# Patient Record
Sex: FEMALE | Race: ASIAN | NOT HISPANIC OR LATINO
[De-identification: names, ages, dates, MRNs, and addresses within clinical notes are randomized per-mention and may not be internally consistent; named-entity substitution may affect disease eponyms.]

---

## 2021-02-04 ENCOUNTER — ASOB RESULT (OUTPATIENT)
Age: 26
End: 2021-02-04

## 2021-02-04 ENCOUNTER — APPOINTMENT (OUTPATIENT)
Dept: ANTEPARTUM | Facility: CLINIC | Age: 26
End: 2021-02-04
Payer: MEDICAID

## 2021-02-04 DIAGNOSIS — Q99.9 CHROMOSOMAL ABNORMALITY, UNSPECIFIED: ICD-10-CM

## 2021-02-04 PROBLEM — Z00.00 ENCOUNTER FOR PREVENTIVE HEALTH EXAMINATION: Status: ACTIVE | Noted: 2021-02-04

## 2021-02-04 PROCEDURE — 99072 ADDL SUPL MATRL&STAF TM PHE: CPT

## 2021-02-04 PROCEDURE — 76813 OB US NUCHAL MEAS 1 GEST: CPT

## 2021-02-09 LAB
1ST TRIMESTER DATA: NORMAL
ADDENDUM DOC: NORMAL
AFP PNL SERPL: NORMAL
AFP SERPL-ACNC: NORMAL
CLINICAL BIOCHEMIST REVIEW: NORMAL
FREE BETA HCG 1ST TRIMESTER: NORMAL
Lab: NORMAL
NOTES NTD: NORMAL
NT: NORMAL
PAPP-A SERPL-ACNC: NORMAL
TRISOMY 18/3: NORMAL

## 2021-03-25 ENCOUNTER — APPOINTMENT (OUTPATIENT)
Dept: ANTEPARTUM | Facility: CLINIC | Age: 26
End: 2021-03-25
Payer: MEDICAID

## 2021-03-25 ENCOUNTER — ASOB RESULT (OUTPATIENT)
Age: 26
End: 2021-03-25

## 2021-03-25 DIAGNOSIS — O35.1XX0 MATERNAL CARE FOR (SUSPECTED) CHROMOSOMAL ABNORMALITY IN FETUS, NOT APPLICABLE OR UNSPECIFIED: ICD-10-CM

## 2021-03-25 PROCEDURE — 99072 ADDL SUPL MATRL&STAF TM PHE: CPT

## 2021-03-25 PROCEDURE — 76811 OB US DETAILED SNGL FETUS: CPT | Mod: 59

## 2021-03-29 LAB
1ST TRIMESTER DATA: NORMAL
2ND TRIMESTER DATA: NORMAL
ADDENDUM DOC: NORMAL
AFP PNL SERPL: NORMAL
AFP SERPL-ACNC: NORMAL
AFP SERPL-ACNC: NORMAL
B-HCG FREE SERPL-MCNC: NORMAL
CLINICAL BIOCHEMIST REVIEW: NORMAL
FREE BETA HCG 1ST TRIMESTER: NORMAL
INHIBIN A SERPL-MCNC: NORMAL
NOTES NTD: NORMAL
NT: NORMAL
PAPP-A SERPL-ACNC: NORMAL
U ESTRIOL SERPL-SCNC: NORMAL

## 2021-04-22 ENCOUNTER — APPOINTMENT (OUTPATIENT)
Dept: ANTEPARTUM | Facility: CLINIC | Age: 26
End: 2021-04-22
Payer: MEDICAID

## 2021-04-22 ENCOUNTER — ASOB RESULT (OUTPATIENT)
Age: 26
End: 2021-04-22

## 2021-04-22 PROCEDURE — 76817 TRANSVAGINAL US OBSTETRIC: CPT

## 2021-04-22 PROCEDURE — 99072 ADDL SUPL MATRL&STAF TM PHE: CPT

## 2021-04-22 PROCEDURE — 76816 OB US FOLLOW-UP PER FETUS: CPT

## 2021-05-04 ENCOUNTER — ASOB RESULT (OUTPATIENT)
Age: 26
End: 2021-05-04

## 2021-05-04 ENCOUNTER — APPOINTMENT (OUTPATIENT)
Dept: ANTEPARTUM | Facility: CLINIC | Age: 26
End: 2021-05-04
Payer: MEDICAID

## 2021-05-04 ENCOUNTER — NON-APPOINTMENT (OUTPATIENT)
Age: 26
End: 2021-05-04

## 2021-05-04 PROCEDURE — 76817 TRANSVAGINAL US OBSTETRIC: CPT

## 2021-05-04 PROCEDURE — 99072 ADDL SUPL MATRL&STAF TM PHE: CPT

## 2021-05-06 ENCOUNTER — EMERGENCY (EMERGENCY)
Facility: HOSPITAL | Age: 26
LOS: 1 days | Discharge: STILL ADMITTED OR ADMINISTRATIVE | End: 2021-05-06
Admitting: EMERGENCY MEDICINE
Payer: SELF-PAY

## 2021-05-06 ENCOUNTER — APPOINTMENT (OUTPATIENT)
Dept: ANTEPARTUM | Facility: HOSPITAL | Age: 26
End: 2021-05-06

## 2021-05-06 ENCOUNTER — ASOB RESULT (OUTPATIENT)
Age: 26
End: 2021-05-06

## 2021-05-06 ENCOUNTER — INPATIENT (INPATIENT)
Facility: HOSPITAL | Age: 26
LOS: 0 days | Discharge: HOME | End: 2021-05-07
Attending: OBSTETRICS & GYNECOLOGY | Admitting: OBSTETRICS & GYNECOLOGY

## 2021-05-06 VITALS
OXYGEN SATURATION: 100 % | HEART RATE: 93 BPM | RESPIRATION RATE: 15 BRPM | SYSTOLIC BLOOD PRESSURE: 119 MMHG | TEMPERATURE: 98 F | DIASTOLIC BLOOD PRESSURE: 75 MMHG

## 2021-05-06 VITALS — TEMPERATURE: 98 F

## 2021-05-06 DIAGNOSIS — Z3A.00 WEEKS OF GESTATION OF PREGNANCY NOT SPECIFIED: ICD-10-CM

## 2021-05-06 DIAGNOSIS — O26.899 OTHER SPECIFIED PREGNANCY RELATED CONDITIONS, UNSPECIFIED TRIMESTER: ICD-10-CM

## 2021-05-06 DIAGNOSIS — Z98.891 HISTORY OF UTERINE SCAR FROM PREVIOUS SURGERY: Chronic | ICD-10-CM

## 2021-05-06 LAB
APPEARANCE UR: CLEAR — SIGNIFICANT CHANGE UP
BASOPHILS # BLD AUTO: 0.05 K/UL — SIGNIFICANT CHANGE UP (ref 0–0.2)
BASOPHILS NFR BLD AUTO: 0.4 % — SIGNIFICANT CHANGE UP (ref 0–2)
BILIRUB UR-MCNC: NEGATIVE — SIGNIFICANT CHANGE UP
COLOR SPEC: COLORLESS — SIGNIFICANT CHANGE UP
COVID-19 SPIKE DOMAIN AB INTERP: POSITIVE
COVID-19 SPIKE DOMAIN ANTIBODY RESULT: 16.5 U/ML — HIGH
DIFF PNL FLD: NEGATIVE — SIGNIFICANT CHANGE UP
EOSINOPHIL # BLD AUTO: 0.13 K/UL — SIGNIFICANT CHANGE UP (ref 0–0.5)
EOSINOPHIL NFR BLD AUTO: 1 % — SIGNIFICANT CHANGE UP (ref 0–6)
GLUCOSE UR QL: NEGATIVE — SIGNIFICANT CHANGE UP
HCT VFR BLD CALC: 35.2 % — SIGNIFICANT CHANGE UP (ref 34.5–45)
HGB BLD-MCNC: 11.3 G/DL — LOW (ref 11.5–15.5)
IANC: 9.06 K/UL — HIGH (ref 1.5–8.5)
IMM GRANULOCYTES NFR BLD AUTO: 1.1 % — SIGNIFICANT CHANGE UP (ref 0–1.5)
KETONES UR-MCNC: NEGATIVE — SIGNIFICANT CHANGE UP
LEUKOCYTE ESTERASE UR-ACNC: NEGATIVE — SIGNIFICANT CHANGE UP
LYMPHOCYTES # BLD AUTO: 24.7 % — SIGNIFICANT CHANGE UP (ref 13–44)
LYMPHOCYTES # BLD AUTO: 3.32 K/UL — HIGH (ref 1–3.3)
MCHC RBC-ENTMCNC: 26.6 PG — LOW (ref 27–34)
MCHC RBC-ENTMCNC: 32.1 GM/DL — SIGNIFICANT CHANGE UP (ref 32–36)
MCV RBC AUTO: 82.8 FL — SIGNIFICANT CHANGE UP (ref 80–100)
MONOCYTES # BLD AUTO: 0.71 K/UL — SIGNIFICANT CHANGE UP (ref 0–0.9)
MONOCYTES NFR BLD AUTO: 5.3 % — SIGNIFICANT CHANGE UP (ref 2–14)
NEUTROPHILS # BLD AUTO: 9.06 K/UL — HIGH (ref 1.8–7.4)
NEUTROPHILS NFR BLD AUTO: 67.5 % — SIGNIFICANT CHANGE UP (ref 43–77)
NITRITE UR-MCNC: NEGATIVE — SIGNIFICANT CHANGE UP
NRBC # BLD: 0 /100 WBCS — SIGNIFICANT CHANGE UP
NRBC # FLD: 0 K/UL — SIGNIFICANT CHANGE UP
PH UR: 7.5 — SIGNIFICANT CHANGE UP (ref 5–8)
PLATELET # BLD AUTO: 174 K/UL — SIGNIFICANT CHANGE UP (ref 150–400)
PROT UR-MCNC: NEGATIVE — SIGNIFICANT CHANGE UP
RBC # BLD: 4.25 M/UL — SIGNIFICANT CHANGE UP (ref 3.8–5.2)
RBC # FLD: 13.6 % — SIGNIFICANT CHANGE UP (ref 10.3–14.5)
RH IG SCN BLD-IMP: POSITIVE — SIGNIFICANT CHANGE UP
SARS-COV-2 IGG+IGM SERPL QL IA: 16.5 U/ML — HIGH
SARS-COV-2 IGG+IGM SERPL QL IA: POSITIVE
SARS-COV-2 RNA SPEC QL NAA+PROBE: SIGNIFICANT CHANGE UP
SP GR SPEC: 1.01 — SIGNIFICANT CHANGE UP (ref 1.01–1.02)
T PALLIDUM AB TITR SER: NEGATIVE — SIGNIFICANT CHANGE UP
UROBILINOGEN FLD QL: SIGNIFICANT CHANGE UP
WBC # BLD: 13.42 K/UL — HIGH (ref 3.8–10.5)
WBC # FLD AUTO: 13.42 K/UL — HIGH (ref 3.8–10.5)

## 2021-05-06 PROCEDURE — L9993: CPT

## 2021-05-06 RX ORDER — MAGNESIUM SULFATE 500 MG/ML
4 VIAL (ML) INJECTION ONCE
Refills: 0 | Status: COMPLETED | OUTPATIENT
Start: 2021-05-06 | End: 2021-05-06

## 2021-05-06 RX ORDER — PROGESTERONE 200 MG/1
200 CAPSULE, LIQUID FILLED ORAL AT BEDTIME
Refills: 0 | Status: DISCONTINUED | OUTPATIENT
Start: 2021-05-06 | End: 2021-05-07

## 2021-05-06 RX ORDER — AMPICILLIN TRIHYDRATE 250 MG
1 CAPSULE ORAL EVERY 4 HOURS
Refills: 0 | Status: DISCONTINUED | OUTPATIENT
Start: 2021-05-06 | End: 2021-05-06

## 2021-05-06 RX ORDER — SODIUM CHLORIDE 9 MG/ML
1000 INJECTION, SOLUTION INTRAVENOUS ONCE
Refills: 0 | Status: DISCONTINUED | OUTPATIENT
Start: 2021-05-06 | End: 2021-05-07

## 2021-05-06 RX ORDER — HEPARIN SODIUM 5000 [USP'U]/ML
5000 INJECTION INTRAVENOUS; SUBCUTANEOUS EVERY 12 HOURS
Refills: 0 | Status: DISCONTINUED | OUTPATIENT
Start: 2021-05-06 | End: 2021-05-07

## 2021-05-06 RX ORDER — MAGNESIUM SULFATE 500 MG/ML
2 VIAL (ML) INJECTION
Qty: 40 | Refills: 0 | Status: DISCONTINUED | OUTPATIENT
Start: 2021-05-06 | End: 2021-05-06

## 2021-05-06 RX ORDER — ASPIRIN/CALCIUM CARB/MAGNESIUM 324 MG
81 TABLET ORAL DAILY
Refills: 0 | Status: DISCONTINUED | OUTPATIENT
Start: 2021-05-06 | End: 2021-05-07

## 2021-05-06 RX ORDER — AMPICILLIN TRIHYDRATE 250 MG
2 CAPSULE ORAL ONCE
Refills: 0 | Status: COMPLETED | OUTPATIENT
Start: 2021-05-06 | End: 2021-05-06

## 2021-05-06 RX ORDER — SODIUM CHLORIDE 9 MG/ML
1000 INJECTION, SOLUTION INTRAVENOUS
Refills: 0 | Status: DISCONTINUED | OUTPATIENT
Start: 2021-05-06 | End: 2021-05-06

## 2021-05-06 RX ADMIN — SODIUM CHLORIDE 75 MILLILITER(S): 9 INJECTION, SOLUTION INTRAVENOUS at 10:46

## 2021-05-06 RX ADMIN — HEPARIN SODIUM 5000 UNIT(S): 5000 INJECTION INTRAVENOUS; SUBCUTANEOUS at 22:35

## 2021-05-06 RX ADMIN — SODIUM CHLORIDE 50 MILLILITER(S): 9 INJECTION, SOLUTION INTRAVENOUS at 10:46

## 2021-05-06 RX ADMIN — Medication 200 GRAM(S): at 10:52

## 2021-05-06 RX ADMIN — Medication 216 GRAM(S): at 10:49

## 2021-05-06 RX ADMIN — Medication 12 MILLIGRAM(S): at 11:00

## 2021-05-06 RX ADMIN — Medication 50 GM/HR: at 11:14

## 2021-05-06 NOTE — OB PROVIDER TRIAGE NOTE - NSOBPROVIDERNOTE_OBGYN_ALL_OB_FT
IVF 1 liter bolus for uterine contractions, known short cx  ua  d/w Dr Hanley- Dr Hanley would like MFM consult to determine if betamethasone appropriate, and to have resident repeat CL to compare.  d/w Dr Ricketts and made aware Dr Hanley would like MFM and for a resident to repeat TVS for CL. Dr Ricketts express concern of contractions on toco and for PTL, wants more monitoring, finish IV fluid bolus and ensure not making cervical change, although pt not feeling pain, cramping or discomfort. Team to discuss patient with MFM and come up with plan and reevaluate patient. IVF 1 liter bolus for uterine contractions, known short cx  ua  d/w Dr Hanley- Dr Hanley would like MFM consult to determine if betamethasone appropriate, and to have resident repeat CL to compare.  d/w Dr Ricketts and made aware Dr Hanley would like MFM and for a resident to repeat TVS for CL. Dr Ricketts express concern of contractions on toco and for PTL, wants more monitoring, finish IV fluid bolus and ensure not making cervical change, although pt not feeling pain, cramping or discomfort. Team to discuss patient with MFM and come up with plan and reevaluate patient.    0735 By the bedside to assess patient. Patient lying comfortably in bed. Patient does not appear to be in distress. Patient reports of fetal movement. Denies cramping, contractions, loss of fluid and/or vaginal bleeding. Awaiting antepartum resident and MFM consult. IVF 1 liter bolus for uterine contractions, known short cx  ua  d/w Dr Hanley- Dr Hnaley would like MFM consult to determine if betamethasone appropriate, and to have resident repeat CL to compare.  d/w Dr Ricketts and made aware Dr Hanley would like MFM and for a resident to repeat TVS for CL. Dr Ricketts express concern of contractions on toco and for PTL, wants more monitoring, finish IV fluid bolus and ensure not making cervical change, although pt not feeling pain, cramping or discomfort. Team to discuss patient with MFM and come up with plan and reevaluate patient.    0735 By the bedside to assess patient. Patient lying comfortably in bed. Patient does not appear to be in distress. Patient reports of fetal movement. Denies cramping, contractions, loss of fluid and/or vaginal bleeding. Awaiting antepartum resident and MFM consult.    4840   Evidence of cervical shortening and pre term contractions  - admit to labor and delivery, discussed with   - for Betamethasone, Ampicillin and Magnesium Sulfate  - admission consents obtained  - COVID testing for patient obtained, no support person by the bedside.

## 2021-05-06 NOTE — ED ADULT TRIAGE NOTE - CHIEF COMPLAINT QUOTE
Pt c/o approx 27 weeks pregnant and "I think my water broke."  Denies any contractions.  Endorses an urge to urinate.  AZAR 8/9/21.

## 2021-05-06 NOTE — OB PROVIDER TRIAGE NOTE - HISTORY OF PRESENT ILLNESS
26 yo  @ 26.3 wks comes in reporting dec fetal movement at 2am, and feeling fluid- stick white discharge at 0200. denies vb, reports +GFM during D&T.. AP course complicated by low pappA on ASA, known short cervix -1cm with funneling,  0.8 cm with funneling on vaginal progesterone. pt reports having intercourse 3 days ago. denies fever chills dysuria ha n/v new swelling vision changes cp sob or cough. last saw OB 5/3 and repeat sono .    meds: PNV ASA progesterone vaginally  all: denies  PMH: denies  PSH: c/s x 1  gyn hx: denies  ob hx: primary c/s @ 36 wks for failure to dilate in [facility] denies complciations

## 2021-05-06 NOTE — CONSULT NOTE ADULT - SUBJECTIVE AND OBJECTIVE BOX
R3 MFM Consult Note    HPI:  24 yo  at 26w3d(AZAR 21 by early sono) presenting with decreased fetal movements since 2am. Upon arrival patient stated that she had fetal movement. She also complains of increased sticky white discharge at 0200. Denies VB. She also reported contractions and cramping when she first arrived which has now resoled.     AP course complicated by low pappA now taking ASA. Known incidentally found short cervix at anatomy. On -1cm with funneling and was started on vaginal progesterone., 5/ 0.8 cm with funneling. Pt reports having intercourse 3 days ago. Denies fever chills dysuria ha n/v new swelling vision changes cp sob or cough.    Meds: PNV ASA progesterone vaginally  All: denies  PMH: denies  PSH: c/s x 1  gyn hx: denies  ob hx: primary c/s @ 36 wks for failure to dilate (states that she came in labor and also had bleeding) in [facility]     Name of GYN Physician:     POB:      Pgyn: Denies fibroids, cysts, endometriosis, STI's, Abnormal pap smears     Home meds:     Hospital Meds:   MEDICATIONS  (STANDING):  ampicillin  IVPB 2 Gram(s) IV Intermittent once  ampicillin  IVPB 1 Gram(s) IV Intermittent every 4 hours  betamethasone Injectable 12 milliGRAM(s) IntraMuscular every 24 hours  lactated ringers Bolus 1000 milliLiter(s) IV Bolus once  lactated ringers. 1000 milliLiter(s) (50 mL/Hr) IV Continuous <Continuous>  magnesium sulfate  IVPB 4 Gram(s) IV Intermittent once  magnesium sulfate Infusion 2 Gm/Hr (50 mL/Hr) IV Continuous <Continuous>    MEDICATIONS  (PRN):    Allergies    No Known Allergies    Intolerances    PAST MEDICAL & SURGICAL HISTORY:  No pertinent past medical history  H/O:  section  2017    FAMILY HISTORY: None pertinent    Social History:  Denies smoking use, drug use, alcohol use.   +occasional social alcohol use    Vital Signs Last 24 Hrs  T(C): 36.9 (06 May 2021 04:44), Max: 36.9 (06 May 2021 04:01)  T(F): 98.4 (06 May 2021 04:44), Max: 98.42 (06 May 2021 04:37)  HR: 90 (06 May 2021 09:04) (70 - 93)  BP: 118/81 (06 May 2021 09:04) (92/55 - 119/75)  BP(mean): --  RR: 16 (06 May 2021 04:44) (15 - 16)  SpO2: 98% (06 May 2021 08:38) (98% - 100%)    Physical Exam:   General: sitting comftorably in bed, NAD   HEENT: neck supple, full ROM  CV: RR S1S2 no m/r/g  Lungs: CTA b/l, good air flow b/l   Back: No CVA tenderness  Abd: Soft, non-tender, non-distended.  Bowel sounds present.    :  No bleeding on pad.    External labia wnl.  Bimanual exam with no cervical motion tenderness, uterus wnl, adnexa non palpable b/l.  Cervix closed vs. Cervix dilated    cm   Speculum Exam: No active bleeding from os.  Physiologic discharge.    Ext: non-tender b/l, no edema     LABS:                I&O's Detail      Urinalysis Basic - ( 06 May 2021 06:13 )    Color: Colorless / Appearance: Clear / S.010 / pH: x  Gluc: x / Ketone: Negative  / Bili: Negative / Urobili: <2 mg/dL   Blood: x / Protein: Negative / Nitrite: Negative   Leuk Esterase: Negative / RBC: x / WBC x   Sq Epi: x / Non Sq Epi: x / Bacteria: x        RADIOLOGY & ADDITIONAL STUDIES: R3 MFM Consult Note    HPI:  24 yo  at 26w3d (AZAR 21 by early sono) presenting with decreased fetal movements since 2am. Upon arrival patient stated that she had fetal movement. She also complains of increased sticky white discharge at 0200. Denies VB. She also reported contractions and cramping when she first arrived which has now resoled.     AP course complicated by low pappA now taking ASA. Known incidentally found short cervix at anatomy. On -1cm with funneling and was started on vaginal progesterone. / 0.8 cm with funneling. Pt reports having intercourse 3 days ago. Denies fever chills dysuria ha n/v new swelling vision changes CP, SOB or cough.    Meds: PNV ASA progesterone vaginally  All: denies  PMH: denies  PSH: c/s x 1  gyn hx: denies  ob hx: primary c/s @ 36 wks for failure to dilate (states that she came in labor and also had bleeding) in [facility]     Name of GYN Physician: Dr. Jade    Vital Signs Last 24 Hrs  T(C): 36.9 (06 May 2021 04:44), Max: 36.9 (06 May 2021 04:01)  T(F): 98.4 (06 May 2021 04:44), Max: 98.42 (06 May 2021 04:37)  HR: 90 (06 May 2021 09:04) (70 - 93)  BP: 118/81 (06 May 2021 09:04) (92/55 - 119/75)  BP(mean): --  RR: 16 (06 May 2021 04:44) (15 - 16)  SpO2: 98% (06 May 2021 08:38) (98% - 100%)    Physical Exam:   General: sitting comftorably in bed, NAD   HEENT: neck supple, full ROM  CV: RR S1S2 no m/r/g  Lungs: CTA b/l, good air flow b/l   Back: No CVA tenderness  Abd: Soft, non-tender, non-distended.  Bowel sounds present.    :  No bleeding on pad.    External labia wnl.  Bimanual exam with no cervical motion tenderness, uterus wnl, adnexa non palpable b/l.  Cervix closed vs. Cervix dilated    cm   Speculum Exam: No active bleeding from os.  Physiologic discharge.    Ext: non-tender b/l, no edema     LABS:                I&O's Detail      Urinalysis Basic - ( 06 May 2021 06:13 )    Color: Colorless / Appearance: Clear / S.010 / pH: x  Gluc: x / Ketone: Negative  / Bili: Negative / Urobili: <2 mg/dL   Blood: x / Protein: Negative / Nitrite: Negative   Leuk Esterase: Negative / RBC: x / WBC x   Sq Epi: x / Non Sq Epi: x / Bacteria: x        RADIOLOGY & ADDITIONAL STUDIES: R3 MFM Consult Note    HPI:  26 yo  at 26w3d (AZAR 21 by early sono) presenting with decreased fetal movements since 2am. Upon arrival patient stated that she had fetal movement. She also complains of increased sticky white discharge at 0200. Denies VB. She also reported contractions and cramping when she first arrived which has now resoled.     AP course complicated by low pappA now taking ASA. Known incidentally found short cervix at anatomy. On -1cm with funneling and was started on vaginal progesterone. 5/ 0.8 cm with funneling. Pt reports having intercourse 3 days ago. Denies fever chills dysuria ha n/v new swelling vision changes CP, SOB or cough.    Meds: PNV ASA progesterone vaginally  All: denies  PMH: denies  PSH: c/s x 1  gyn hx: denies  ob hx: primary c/s @ 36 wks for failure to dilate (states that she came in labor and also had bleeding) in [facility]     Name of GYN Physician: Dr. Jade    Vital Signs Last 24 Hrs  T(C): 36.9 (06 May 2021 04:44), Max: 36.9 (06 May 2021 04:01)  T(F): 98.4 (06 May 2021 04:44), Max: 98.42 (06 May 2021 04:37)  HR: 90 (06 May 2021 09:04) (70 - 93)  BP: 118/81 (06 May 2021 09:04) (92/55 - 119/75)  BP(mean): --  RR: 16 (06 May 2021 04:44) (15 - 16)  SpO2: 98% (06 May 2021 08:38) (98% - 100%)    Physical Exam:   General: sitting comfortably in bed, NAD   CV: RR S1S2 no m/r/g  Lungs: CTA b/l, good air flow b/l   Back: No CVA tenderness  Abd: Soft, non-tender, non-distended.  Bowel sounds present.    :  No bleeding on pad. External labia wnl.  Bimanual exam with no cervical motion tenderness, uterus wnl, adnexa non palpable b/l. Cervix dilated 1cm   Speculum Exam: No active bleeding from os. cervix appears slightly open. Remnants of vaginal progesterone noted. ferning, nitrazine, and pooling neg.  Physiologic discharge.    Ext: non-tender b/l, no edema     TVS: CL 0.9-1.4 with funneling- pt with known short cx  TAS:  posterior placenta  vertex  BERENICE: 11.9  EFW:801g/1#12    LABS:  pending    Urinalysis Basic - ( 06 May 2021 06:13 )    Color: Colorless / Appearance: Clear / S.010 / pH: x  Gluc: x / Ketone: Negative  / Bili: Negative / Urobili: <2 mg/dL   Blood: x / Protein: Negative / Nitrite: Negative   Leuk Esterase: Negative / RBC: x / WBC x   Sq Epi: x / Non Sq Epi: x / Bacteria: x

## 2021-05-06 NOTE — CONSULT NOTE ADULT - ASSESSMENT
26yo  at 26w3d here with hx of short cervix in the pregnancy on vaginal progesterone a/w  labor. Patient's contractions have now stopped after IV fluids started however she has had cervical change during her time in triage. Counseled patient there is still a 50% chance she may go to term however as there is a significant risk of continue  labor and  delivery, would recommend interventions and hospitalization at this time. Explained the utility of  steroids, Magnesium for neuroprotection, and ampicillin for unknown GBS status. Spoke with the patient using  as . Patient expressed understanding.    In regards to method of delivery, explained that c/s may be needed for fetal indication for urgent delivery.  Patient expressed desire to TOLAC and would like to discuss with her private doctor.   Fetus is vertex, TOLAC can be considered after discussions of risks of uterine rupture after 1 prior section.    -c/w BMZ, amp, Mg  -cont toco/efm  -re-evaluate for cervical change  -NICU consult    LINNEA Perez PGY-3  D/w Dr. Ruiz and Dr. Lopez

## 2021-05-06 NOTE — CHART NOTE - NSCHARTNOTEFT_GEN_A_CORE
Ms Meza is a 26 yo  @ 26.3 wks who is admitted with concerns of PTL. Started feeling decreased fetal movement and vaginal discharge at 2am. Noted to have shortened cervix, dilation to 1 cm and  contractions in triage. Admitted to the antepartum service. Receiving betamethasone, ampicillin and MgSO4. EFW is 801 g on admission. Baby is male.     NICU team met with Ms. Meza who had her  on the phone. The following was discussed with the couple:    1. The NICU team will be present at the time of delivery, and the infant will be placed under the warmer and immediately evaluated.    2. Due to extreme prematurity the infant will require respiratory support, either in the form of CPAP or intubation with mechanical ventilation. If the infant requires intubation, surfactant will be administered immediately at delivery or soon after. Due to prematurity, the infant may develop lung disease during the course of the NICU admission, referred to as bronchopulmonary dysplasia.    3. Full feedings will not be started right away. The infant would require placement of an orogastric or nasogastric tube to provide enteral feedings, and feeding volume would be advanced slowly as tolerated. IV nutrition in the form of TPN would be provided via umbilical line or PICC until the infant is able to tolerate full enteral feedings. Discussed the benefits of human milk for  infants and encouraged mother to pump following delivery. Mom's feeding plan is breastmilk only.     4. Due to the extreme prematurity, the infant would be at increased risk for infection and would likely be started on antibiotics after birth. The infant will be screened for infections following delivery and may require other courses of antibiotics during their hospital course if an infection were suspected. If the infant shows signs and symptoms of feeding intolerance, feedings may be held, and the infant may require evaluation for intestinal infection (necrotizing enterocolitis).    5. The infant will be monitored for hypotension and may require vasopressor medication. The infant may also require screening for a patent ductus arteriosus, and if clinically significant, may require medical or surgical treatment.    6. The infant will develop anemia of prematurity and may require blood transfusions.    1. The infant is at increased risk for intraventricular hemorrhage (IVH) because the blood vessels in the developing brain are very fragile. This may result in significant developmental delays. The baby will receive serial HUS to monitor for IVH. Even in the absence of IVH, the infant is at risk for developmental delays as a consequence of prematurity. The infant will be evaluated by a developmental pediatrician to monitor for neurodevelopmental delays.    7. The infant is at risk for retinopathy of prematurity (ROP). Severe ROP can lead to diminished visual acuity or blindness. The infant will be examined regularly by a pediatric ophthalmologist, and if ROP is severe may require medication or eye surgery.    8. The infant is at risk for jaundice and may require phototherapy.    9. Thermoregulation issues and need to be in an isolette with slow weaning to a crib discussed.    10. Length of stay is highly variable, but given the infant’s gestational age, average stay is approximately 2.5-3 months. Discussed discharged criteria.    Ms. Meza and her partner had the chance to ask any questions they may have had, and was encouraged to contact the NICU at that time if additional questions arise.    Thank you for the opportunity to participate in the care of this patient and please inform us of any changes in her status.

## 2021-05-06 NOTE — OB RN TRIAGE NOTE - CHIEF COMPLAINT QUOTE
Fluid leakage since 0300 , and decreased fetal movement since 0200 . Pt. is a previous C/Sx1 in [facility].

## 2021-05-06 NOTE — OB PROVIDER TRIAGE NOTE - NSHPPHYSICALEXAM_GEN_ALL_CORE
LS clear bilaterally  abd soft gravid NT  CV RRR  SSE: cx appears closed- cx located maternal right anterior vaginal wall, no pooling or bleeding nitrazine negative fern negative  SVE: external os fingertip soft  TVS: CL 0.9-1.4 with funneling- pt with known short cx  TAS:  posterior placenta  vertex  BERENICE: 11.9  EFW:801g/1#12  FHT: moderate varaibility, + accels, + variables  toco: + contractions q 3-4 minutes - pt not feeling cramping or lower back pain 0/10 on pain scale  Vital Signs Last 24 Hrs  T(C): 36.9 (06 May 2021 04:44), Max: 36.9 (06 May 2021 04:01)  T(F): 98.4 (06 May 2021 04:44), Max: 98.42 (06 May 2021 04:37)  HR: 86 (06 May 2021 04:44) (86 - 93)  BP: 113/77 (06 May 2021 04:44) (113/77 - 119/75)  BP(mean): --  RR: 16 (06 May 2021 04:44) (15 - 16)  SpO2: 100% (06 May 2021 04:01) (100% - 100%) LS clear bilaterally  abd soft gravid NT  CV RRR  SSE: cx appears closed- cx located maternal right anterior vaginal wall, no pooling or bleeding nitrazine negative fern negative  SVE: external os fingertip soft  TVS: CL 0.9-1.4 with funneling- pt with known short cx  TAS:  posterior placenta  vertex  BERENICE: 11.9  EFW:801g/1#12  FHT: moderate varaibility, + accels, + variables  toco: + contractions q 3-4 minutes - pt not feeling cramping or lower back pain 0/10 on pain scale  Vital Signs Last 24 Hrs  T(C): 36.9 (06 May 2021 04:44), Max: 36.9 (06 May 2021 04:01)  T(F): 98.4 (06 May 2021 04:44), Max: 98.42 (06 May 2021 04:37)  HR: 86 (06 May 2021 04:44) (86 - 93)  BP: 113/77 (06 May 2021 04:44) (113/77 - 119/75)  BP(mean): --  RR: 16 (06 May 2021 04:44) (15 - 16)  SpO2: 100% (06 May 2021 04:01) (100% - 100%)    Repeat exam at 909am by  1/50/-3

## 2021-05-06 NOTE — OB PROVIDER H&P - PROBLEM SELECTOR PLAN 1
Evidence of cervical shortening and pre term contractions  - admit to labor and delivery, discussed with   - for Betamethasone, Ampicillin and Magnesium Sulfate  - admission consents obtained  - COVID testing for patient obtained, no support person by the bedside.   - urine culture ordered, obtained, result pending   - GBS specimen obtained, ordered, result pending

## 2021-05-06 NOTE — OB PROVIDER H&P - HISTORY OF PRESENT ILLNESS
26 yo  @ 26.3 wks comes in reporting dec fetal movement at 2am, and feeling fluid- stick white discharge at 0200. denies vb, reports +GFM during D&T.. AP course complicated by low pappA on ASA, known short cervix -1cm with funneling,  0.8 cm with funneling on vaginal progesterone. pt reports having intercourse 3 days ago. denies fever chills dysuria ha n/v new swelling vision changes cp sob or cough. last saw OB 5/3 and repeat sono .    meds: PNV ASA progesterone vaginally  all: denies  PMH: denies  PSH: c/s x 1  gyn hx: denies  ob hx: primary c/s @ 36 wks for failure to dilate in [facility] denies complciations

## 2021-05-06 NOTE — OB PROVIDER H&P - NSHPPHYSICALEXAM_GEN_ALL_CORE
LS clear bilaterally  abd soft gravid NT  CV RRR  SSE: cx appears closed- cx located maternal right anterior vaginal wall, no pooling or bleeding nitrazine negative fern negative  SVE: external os fingertip soft  TVS: CL 0.9-1.4 with funneling- pt with known short cx  TAS:  posterior placenta  vertex  BERENICE: 11.9  EFW:801g/1#12  FHT: moderate varaibility, + accels, + variables  toco: + contractions q 3-4 minutes - pt not feeling cramping or lower back pain 0/10 on pain scale  Vital Signs Last 24 Hrs  T(C): 36.9 (06 May 2021 04:44), Max: 36.9 (06 May 2021 04:01)  T(F): 98.4 (06 May 2021 04:44), Max: 98.42 (06 May 2021 04:37)  HR: 86 (06 May 2021 04:44) (86 - 93)  BP: 113/77 (06 May 2021 04:44) (113/77 - 119/75)  BP(mean): --  RR: 16 (06 May 2021 04:44) (15 - 16)  SpO2: 100% (06 May 2021 04:01) (100% - 100%)    Repeat exam at 909am by  1/50/-3

## 2021-05-07 ENCOUNTER — TRANSCRIPTION ENCOUNTER (OUTPATIENT)
Age: 26
End: 2021-05-07

## 2021-05-07 VITALS
TEMPERATURE: 98 F | SYSTOLIC BLOOD PRESSURE: 98 MMHG | RESPIRATION RATE: 18 BRPM | DIASTOLIC BLOOD PRESSURE: 51 MMHG | OXYGEN SATURATION: 98 % | HEART RATE: 81 BPM

## 2021-05-07 RX ORDER — PROGESTERONE 200 MG/1
1 CAPSULE, LIQUID FILLED ORAL
Qty: 0 | Refills: 0 | DISCHARGE

## 2021-05-07 RX ORDER — PROGESTERONE 200 MG/1
200 CAPSULE, LIQUID FILLED ORAL
Qty: 0 | Refills: 0 | DISCHARGE
Start: 2021-05-07

## 2021-05-07 RX ADMIN — HEPARIN SODIUM 5000 UNIT(S): 5000 INJECTION INTRAVENOUS; SUBCUTANEOUS at 11:24

## 2021-05-07 RX ADMIN — Medication 81 MILLIGRAM(S): at 11:24

## 2021-05-07 RX ADMIN — PROGESTERONE 200 MILLIGRAM(S): 200 CAPSULE, LIQUID FILLED ORAL at 00:33

## 2021-05-07 RX ADMIN — Medication 12 MILLIGRAM(S): at 11:24

## 2021-05-07 RX ADMIN — Medication 1 TABLET(S): at 11:25

## 2021-05-07 NOTE — PROGRESS NOTE ADULT - ASSESSMENT
26yo  at 26w4d with known short cervix on vaginal progesterone a/w  labor. CTX stopped with IV hydration however patient made cervical change from closed to /-3. VE stable on repeat exam. Asymptomatic currently. Maternal and fetal status reassuring overnight.    # labor  - WBC 13, UA clean  - c/w BMZ (-)  - s/p Amp ()  - s/p Mag ()  - Monitor for worsening contractions or pressure    #FWB  - BMZ as above  - NST BID  - s/p NICU consult  - PNVs    #MWB  - Reg diet  - SLIV  - HSQ for DVT ppx  - Desires TOLAC, vtx on most recent nico Doss PGY3

## 2021-05-07 NOTE — DISCHARGE NOTE ANTEPARTUM - CARE PROVIDER_API CALL
Alex Jade)  Obstetrics and Gynecology  [address] [place] [address]  Phone: [phone]  Fax: [phone]  Follow Up Time:

## 2021-05-07 NOTE — PROGRESS NOTE ADULT - SUBJECTIVE AND OBJECTIVE BOX
R3 Antepartum Note, HD#2    Interval events: none.    Patient seen and examined at bedside, no acute overnight events. No acute complaints. Pt reports +FM, denies LOF, VB, ctx, HA, epigastric pain, blurred vision, CP, SOB, N/V, fevers, and chills.    Vital Signs Last 24 Hours  T(C): 36.4 (05-07-21 @ 05:36), Max: 36.9 (05-06-21 @ 10:57)  HR: 81 (05-07-21 @ 05:36) (70 - 110)  BP: 97/53 (05-07-21 @ 05:36) (87/49 - 118/81)  RR: 17 (05-07-21 @ 05:36) (17 - 17)  SpO2: 97% (05-07-21 @ 01:11) (91% - 100%)    CAPILLARY BLOOD GLUCOSE      Physical Exam:  General: NAD  Abdomen: Soft, non-tender, gravid  Ext: No pain or swelling    NST reactive overnight    Labs:             11.3   13.42 )-----------( 174      ( 05-06 @ 10:52 )             35.2           MEDICATIONS  (STANDING):  aspirin enteric coated 81 milliGRAM(s) Oral daily  betamethasone Injectable 12 milliGRAM(s) IntraMuscular every 24 hours  heparin   Injectable 5000 Unit(s) SubCutaneous every 12 hours  lactated ringers Bolus 1000 milliLiter(s) IV Bolus once  prenatal multivitamin 1 Tablet(s) Oral daily  progesterone Vaginal Insert 200 milliGRAM(s) Vaginal at bedtime    MEDICATIONS  (PRN):

## 2021-05-07 NOTE — DISCHARGE NOTE ANTEPARTUM - PLAN OF CARE
Continue prenatal course Follow up with OB doctor within one week  Pelvic rest  Return with contractions, vaginal bleeding, leaking fluid or decreased fetal movement

## 2021-05-07 NOTE — DISCHARGE NOTE ANTEPARTUM - CARE PLAN
Principal Discharge DX:	 contractions  Goal:	Continue prenatal course  Assessment and plan of treatment:	Follow up with OB doctor within one week  Pelvic rest  Return with contractions, vaginal bleeding, leaking fluid or decreased fetal movement

## 2021-05-07 NOTE — DISCHARGE NOTE ANTEPARTUM - NSCORESITESY/N_GEN_A_CORE_RD
Viral Upper Respiratory Illness (Adult)  You have a viral upper respiratory illness (URI), which is another term for the common cold. This illness is contagious during the first few days. It is spread through the air by coughing and sneezing. It may also be spread by direct contact (touching the sick person and then touching your own eyes, nose, or mouth). Frequent handwashing will decrease risk of spread. Most viral illnesses go away within 7 to 10 days with rest and simple home remedies. Sometimes the illness may last for several weeks. Antibiotics will not kill a virus, and they are generally not prescribed for this condition.    Home care  If symptoms are severe, rest at home for the first 2 to 3 days. When you resume activity, don't let yourself get too tired.  Avoid being exposed to cigarette smoke (yours or others’).  You may use acetaminophen or ibuprofen to control pain and fever, unless another medicine was prescribed. If you have chronic liver or kidney disease, have ever had a stomach ulcer or gastrointestinal bleeding, or are taking blood-thinning medicines, talk with your healthcare provider before using these medicines. Aspirin should never be given to anyone under 18 years of age who is ill with a viral infection or fever. It may cause severe liver or brain damage.  Your appetite may be poor, so a light diet is fine. Avoid dehydration by drinking 6 to 8 glasses of fluids per day (water, soft drinks, juices, tea, or soup). Extra fluids will help loosen secretions in the nose and lungs.  Over-the-counter cold medicines will not shorten the length of time you’re sick, but they may be helpful for the following symptoms: cough, sore throat, and nasal and sinus congestion. (Note: Do not use decongestants if you have high blood pressure.)  Follow-up care  Follow up with your healthcare provider, or as advised.  When to seek medical advice  Call your healthcare provider right away if any of these  occur:  Cough with lots of colored sputum (mucus)  Severe headache; face, neck, or ear pain  Difficulty swallowing due to throat pain  Fever of 100.4°F (38°C) or higher, or as directed by your healthcare provider  Call 911  Call 911 if any of these occur:  Chest pain, shortness of breath, wheezing, or difficulty breathing  Coughing up blood  Inability to swallow due to throat pain  Date Last Reviewed: 9/13/2015  © 1595-7572 [employer]. [address] [place] [address]. All rights reserved. This information is not intended as a substitute for professional medical care. Always follow your healthcare professional's instructions.           No

## 2021-05-07 NOTE — DISCHARGE NOTE ANTEPARTUM - MEDICATION SUMMARY - MEDICATIONS TO TAKE
I will START or STAY ON the medications listed below when I get home from the hospital:    aspirin 81 mg oral tablet  -- Indication: For Home med    PNV Prenatal oral tablet  -- 1 tab(s) by mouth once a day  -- Indication: For Prenatal vitamins    progesterone 100 mg vaginal insert  -- 200 milligram(s) vaginal  -- Indication: For short cervix

## 2021-05-07 NOTE — DISCHARGE NOTE ANTEPARTUM - HOSPITAL COURSE
26yo  at 26w4d with known short cervix on vaginal progesterone a/w  labor. Patient given betamethasone, magnesium sulfate, and Ampicillin on admission. Contractions then resolved. VE /-3 x3 exams. Patient recieved 2 doses of BMZ on -. Denies contractions, vaginal bleeding, leaking fluid. ATU sono : vtx/ posterior placenta./ MVP7 EFW 880grams BPP 8/8 CL 1.0cm.   Patient is doing well and stable for discharge home with close outpatient follow up within one week.

## 2021-05-07 NOTE — DISCHARGE NOTE ANTEPARTUM - MEDICATION SUMMARY - MEDICATIONS TO CHANGE
I will SWITCH the dose or number of times a day I take the medications listed below when I get home from the hospital:    progesterone 50 mg vaginal suppository  -- 1 suppository(ies) vaginally once a day

## 2021-05-07 NOTE — DISCHARGE NOTE ANTEPARTUM - PATIENT PORTAL LINK FT
You can access the FollowMyHealth Patient Portal offered by [facility] by registering at the following website: [email or web address]. By joining [employer]’s FollowMyHealth portal, you will also be able to view your health information using other applications (apps) compatible with our system.

## 2021-05-07 NOTE — CHART NOTE - NSCHARTNOTEFT_GEN_A_CORE
Patient seen and evaluated for cervical change. Pt denies contractions, vaginal bleeding, leaking fluid. Endorses +FM.     PE:  Vital Signs Last 24 Hrs  T(C): 36.6 (07 May 2021 10:23), Max: 36.8 (07 May 2021 01:11)  T(F): 97.8 (07 May 2021 10:23), Max: 98.3 (07 May 2021 01:11)  HR: 76 (07 May 2021 10:41) (72 - 110)  BP: 92/52 (07 May 2021 10:41) (92/52 - 109/64)  BP(mean): --  RR: 16 (07 May 2021 10:23) (16 - 17)  SpO2: 98% (07 May 2021 10:38) (91% - 100%)  EFM: 120 moderate variability + acels occasioanl variable decels  [place]: none  VE:1/50/-3 unchanged    Plan:  - discharge home with close outpatient follow up within one week and strict PTL precautions    d/w Dr. Dayan Nicolas [facility]

## 2021-05-08 LAB
CULTURE RESULTS: SIGNIFICANT CHANGE UP
SPECIMEN SOURCE: SIGNIFICANT CHANGE UP

## 2021-05-09 LAB
CULTURE RESULTS: SIGNIFICANT CHANGE UP
SPECIMEN SOURCE: SIGNIFICANT CHANGE UP

## 2021-05-13 PROBLEM — Z78.9 OTHER SPECIFIED HEALTH STATUS: Chronic | Status: ACTIVE | Noted: 2021-05-06

## 2021-05-17 ENCOUNTER — ASOB RESULT (OUTPATIENT)
Age: 26
End: 2021-05-17

## 2021-05-17 ENCOUNTER — APPOINTMENT (OUTPATIENT)
Dept: ANTEPARTUM | Facility: CLINIC | Age: 26
End: 2021-05-17
Payer: MEDICAID

## 2021-05-17 PROCEDURE — 76820 UMBILICAL ARTERY ECHO: CPT

## 2021-05-17 PROCEDURE — 93976 VASCULAR STUDY: CPT

## 2021-05-17 PROCEDURE — 76816 OB US FOLLOW-UP PER FETUS: CPT

## 2021-05-19 ENCOUNTER — APPOINTMENT (OUTPATIENT)
Dept: MATERNAL FETAL MEDICINE | Facility: CLINIC | Age: 26
End: 2021-05-19
Payer: MEDICAID

## 2021-05-19 ENCOUNTER — ASOB RESULT (OUTPATIENT)
Age: 26
End: 2021-05-19

## 2021-05-19 PROCEDURE — G0108 DIAB MANAGE TRN  PER INDIV: CPT | Mod: 95

## 2021-05-20 RX ORDER — BLOOD-GLUCOSE METER
W/DEVICE KIT MISCELLANEOUS
Qty: 1 | Refills: 0 | Status: ACTIVE | COMMUNITY
Start: 2021-05-19 | End: 1900-01-01

## 2021-05-20 RX ORDER — BLOOD-GLUCOSE METER
KIT MISCELLANEOUS 4 TIMES DAILY
Qty: 1 | Refills: 1 | Status: ACTIVE | COMMUNITY
Start: 2021-05-19 | End: 1900-01-01

## 2021-05-20 RX ORDER — LANCETS 33 GAUGE
EACH MISCELLANEOUS
Qty: 1 | Refills: 2 | Status: ACTIVE | COMMUNITY
Start: 2021-05-19 | End: 1900-01-01

## 2021-05-20 RX ORDER — URINE ACETONE TEST STRIPS
STRIP MISCELLANEOUS
Qty: 1 | Refills: 2 | Status: ACTIVE | COMMUNITY
Start: 2021-05-19 | End: 1900-01-01

## 2021-06-02 ENCOUNTER — APPOINTMENT (OUTPATIENT)
Dept: MATERNAL FETAL MEDICINE | Facility: CLINIC | Age: 26
End: 2021-06-02
Payer: MEDICAID

## 2021-06-02 ENCOUNTER — NON-APPOINTMENT (OUTPATIENT)
Age: 26
End: 2021-06-02

## 2021-06-02 ENCOUNTER — ASOB RESULT (OUTPATIENT)
Age: 26
End: 2021-06-02

## 2021-06-02 PROCEDURE — G0108 DIAB MANAGE TRN  PER INDIV: CPT | Mod: 95

## 2021-06-14 ENCOUNTER — APPOINTMENT (OUTPATIENT)
Dept: ANTEPARTUM | Facility: CLINIC | Age: 26
End: 2021-06-14
Payer: MEDICAID

## 2021-06-14 ENCOUNTER — ASOB RESULT (OUTPATIENT)
Age: 26
End: 2021-06-14

## 2021-06-14 PROCEDURE — 76820 UMBILICAL ARTERY ECHO: CPT

## 2021-06-14 PROCEDURE — 76816 OB US FOLLOW-UP PER FETUS: CPT

## 2021-06-14 PROCEDURE — 93976 VASCULAR STUDY: CPT

## 2021-06-18 ENCOUNTER — ASOB RESULT (OUTPATIENT)
Age: 26
End: 2021-06-18

## 2021-06-18 ENCOUNTER — APPOINTMENT (OUTPATIENT)
Dept: MATERNAL FETAL MEDICINE | Facility: CLINIC | Age: 26
End: 2021-06-18
Payer: MEDICAID

## 2021-06-18 ENCOUNTER — APPOINTMENT (OUTPATIENT)
Dept: MATERNAL FETAL MEDICINE | Facility: CLINIC | Age: 26
End: 2021-06-18

## 2021-06-18 PROCEDURE — G0108 DIAB MANAGE TRN  PER INDIV: CPT | Mod: 95

## 2021-06-23 ENCOUNTER — APPOINTMENT (OUTPATIENT)
Dept: ANTEPARTUM | Facility: CLINIC | Age: 26
End: 2021-06-23

## 2021-07-09 ENCOUNTER — ASOB RESULT (OUTPATIENT)
Age: 26
End: 2021-07-09

## 2021-07-09 ENCOUNTER — APPOINTMENT (OUTPATIENT)
Dept: MATERNAL FETAL MEDICINE | Facility: CLINIC | Age: 26
End: 2021-07-09
Payer: MEDICAID

## 2021-07-09 PROCEDURE — G0108 DIAB MANAGE TRN  PER INDIV: CPT | Mod: 95

## 2021-07-12 ENCOUNTER — APPOINTMENT (OUTPATIENT)
Dept: ANTEPARTUM | Facility: CLINIC | Age: 26
End: 2021-07-12

## 2021-07-12 ENCOUNTER — INPATIENT (INPATIENT)
Facility: HOSPITAL | Age: 26
LOS: 2 days | Discharge: HOME | End: 2021-07-15
Attending: OBSTETRICS & GYNECOLOGY | Admitting: OBSTETRICS & GYNECOLOGY

## 2021-07-12 ENCOUNTER — TRANSCRIPTION ENCOUNTER (OUTPATIENT)
Age: 26
End: 2021-07-12

## 2021-07-12 ENCOUNTER — EMERGENCY (EMERGENCY)
Facility: HOSPITAL | Age: 26
LOS: 1 days | Discharge: STILL ADMITTED OR ADMINISTRATIVE | End: 2021-07-12
Admitting: EMERGENCY MEDICINE
Payer: SELF-PAY

## 2021-07-12 VITALS
TEMPERATURE: 98 F | DIASTOLIC BLOOD PRESSURE: 60 MMHG | SYSTOLIC BLOOD PRESSURE: 112 MMHG | RESPIRATION RATE: 16 BRPM | HEART RATE: 93 BPM | HEIGHT: 62 IN | OXYGEN SATURATION: 100 %

## 2021-07-12 VITALS
SYSTOLIC BLOOD PRESSURE: 114 MMHG | TEMPERATURE: 99 F | DIASTOLIC BLOOD PRESSURE: 70 MMHG | HEART RATE: 78 BPM | RESPIRATION RATE: 16 BRPM

## 2021-07-12 DIAGNOSIS — Z98.891 HISTORY OF UTERINE SCAR FROM PREVIOUS SURGERY: ICD-10-CM

## 2021-07-12 DIAGNOSIS — O26.899 OTHER SPECIFIED PREGNANCY RELATED CONDITIONS, UNSPECIFIED TRIMESTER: ICD-10-CM

## 2021-07-12 DIAGNOSIS — Z3A.00 WEEKS OF GESTATION OF PREGNANCY NOT SPECIFIED: ICD-10-CM

## 2021-07-12 DIAGNOSIS — Z98.891 HISTORY OF UTERINE SCAR FROM PREVIOUS SURGERY: Chronic | ICD-10-CM

## 2021-07-12 LAB
BASOPHILS # BLD AUTO: 0.05 K/UL — SIGNIFICANT CHANGE UP (ref 0–0.2)
BASOPHILS NFR BLD AUTO: 0.4 % — SIGNIFICANT CHANGE UP (ref 0–2)
BLD GP AB SCN SERPL QL: NEGATIVE — SIGNIFICANT CHANGE UP
DIALYSIS INSTRUMENT RESULT - HEPATITIS B SURFACE ANTIGEN: NEGATIVE — SIGNIFICANT CHANGE UP
EOSINOPHIL # BLD AUTO: 0.26 K/UL — SIGNIFICANT CHANGE UP (ref 0–0.5)
EOSINOPHIL NFR BLD AUTO: 2 % — SIGNIFICANT CHANGE UP (ref 0–6)
GLUCOSE BLDC GLUCOMTR-MCNC: 94 MG/DL — SIGNIFICANT CHANGE UP (ref 70–99)
HCT VFR BLD CALC: 39.1 % — SIGNIFICANT CHANGE UP (ref 34.5–45)
HGB BLD-MCNC: 12.2 G/DL — SIGNIFICANT CHANGE UP (ref 11.5–15.5)
HIV 1+2 AB+HIV1 P24 AG SERPL QL IA: SIGNIFICANT CHANGE UP
IANC: 9.39 K/UL — HIGH (ref 1.5–8.5)
IMM GRANULOCYTES NFR BLD AUTO: 1.1 % — SIGNIFICANT CHANGE UP (ref 0–1.5)
LYMPHOCYTES # BLD AUTO: 19.9 % — SIGNIFICANT CHANGE UP (ref 13–44)
LYMPHOCYTES # BLD AUTO: 2.63 K/UL — SIGNIFICANT CHANGE UP (ref 1–3.3)
MCHC RBC-ENTMCNC: 25.8 PG — LOW (ref 27–34)
MCHC RBC-ENTMCNC: 31.2 GM/DL — LOW (ref 32–36)
MCV RBC AUTO: 82.8 FL — SIGNIFICANT CHANGE UP (ref 80–100)
MONOCYTES # BLD AUTO: 0.73 K/UL — SIGNIFICANT CHANGE UP (ref 0–0.9)
MONOCYTES NFR BLD AUTO: 5.5 % — SIGNIFICANT CHANGE UP (ref 2–14)
NEUTROPHILS # BLD AUTO: 9.39 K/UL — HIGH (ref 1.8–7.4)
NEUTROPHILS NFR BLD AUTO: 71.1 % — SIGNIFICANT CHANGE UP (ref 43–77)
NRBC # BLD: 0 /100 WBCS — SIGNIFICANT CHANGE UP
NRBC # FLD: 0 K/UL — SIGNIFICANT CHANGE UP
PLATELET # BLD AUTO: 226 K/UL — SIGNIFICANT CHANGE UP (ref 150–400)
RBC # BLD: 4.72 M/UL — SIGNIFICANT CHANGE UP (ref 3.8–5.2)
RBC # FLD: 13.9 % — SIGNIFICANT CHANGE UP (ref 10.3–14.5)
RH IG SCN BLD-IMP: POSITIVE — SIGNIFICANT CHANGE UP
SARS-COV-2 RNA SPEC QL NAA+PROBE: SIGNIFICANT CHANGE UP
WBC # BLD: 13.21 K/UL — HIGH (ref 3.8–10.5)
WBC # FLD AUTO: 13.21 K/UL — HIGH (ref 3.8–10.5)

## 2021-07-12 PROCEDURE — L9993: CPT

## 2021-07-12 RX ORDER — FAMOTIDINE 10 MG/ML
20 INJECTION INTRAVENOUS ONCE
Refills: 0 | Status: DISCONTINUED | OUTPATIENT
Start: 2021-07-12 | End: 2021-07-13

## 2021-07-12 RX ORDER — SODIUM CHLORIDE 9 MG/ML
1000 INJECTION, SOLUTION INTRAVENOUS
Refills: 0 | Status: DISCONTINUED | OUTPATIENT
Start: 2021-07-12 | End: 2021-07-13

## 2021-07-12 RX ORDER — SODIUM CHLORIDE 9 MG/ML
1000 INJECTION, SOLUTION INTRAVENOUS ONCE
Refills: 0 | Status: DISCONTINUED | OUTPATIENT
Start: 2021-07-12 | End: 2021-07-12

## 2021-07-12 RX ORDER — OXYTOCIN 10 UNIT/ML
333.33 VIAL (ML) INJECTION
Qty: 20 | Refills: 0 | Status: DISCONTINUED | OUTPATIENT
Start: 2021-07-12 | End: 2021-07-12

## 2021-07-12 RX ORDER — CITRIC ACID/SODIUM CITRATE 300-500 MG
30 SOLUTION, ORAL ORAL ONCE
Refills: 0 | Status: DISCONTINUED | OUTPATIENT
Start: 2021-07-12 | End: 2021-07-12

## 2021-07-12 RX ORDER — FAMOTIDINE 10 MG/ML
20 INJECTION INTRAVENOUS ONCE
Refills: 0 | Status: DISCONTINUED | OUTPATIENT
Start: 2021-07-12 | End: 2021-07-12

## 2021-07-12 RX ORDER — OXYTOCIN 10 UNIT/ML
333.33 VIAL (ML) INJECTION
Qty: 20 | Refills: 0 | Status: COMPLETED | OUTPATIENT
Start: 2021-07-12 | End: 2021-07-13

## 2021-07-12 RX ORDER — CITRIC ACID/SODIUM CITRATE 300-500 MG
30 SOLUTION, ORAL ORAL ONCE
Refills: 0 | Status: DISCONTINUED | OUTPATIENT
Start: 2021-07-12 | End: 2021-07-13

## 2021-07-12 RX ADMIN — SODIUM CHLORIDE 125 MILLILITER(S): 9 INJECTION, SOLUTION INTRAVENOUS at 18:00

## 2021-07-12 NOTE — OB PROVIDER TRIAGE NOTE - HISTORY OF PRESENT ILLNESS
24yo  @ 36.0 presents with c/o LOF since 11am. Denies ctx, VB and reports GFM.   Denies H/O COVID-19 or recent exposure  Last PO 3:00pm  Desires TOLAC    Denies PMH/PSH

## 2021-07-12 NOTE — OB RN PATIENT PROFILE - NS_OBGYNHISTORY_OBGYN_ALL_OB_FT
2017 FT - Arrest/bleeding in [facility]- 5-0    AP course complicated by:  GDMA1  Short cervix treated with PV progesterone- admitted  for beta/mag

## 2021-07-12 NOTE — OB RN PATIENT PROFILE - CURRENT PREGNANCY COMPLICATIONS, OB PROFILE
Short cervix/Admitted 5-6 for beta/mag/Gestational Diabetes/Incompetent Cervix/Cervical Insufficiency

## 2021-07-12 NOTE — OB PROVIDER H&P - NSHPPHYSICALEXAM_GEN_ALL_CORE
Assessment reveals VSS, abdomen soft, NT, gravid.  SSE- +pooling, +nitrizine, +fern  VE 1/50/-3  BPP 8/8, BERENICE 15.8, vtx, EFW 2304g, posterior placenta  Cat 1 FHT, irregular ctx on toco Assessment reveals VSS, abdomen soft, NT, gravid.  SSE- +pooling, +nitrizine, +fern  VE /50/-3  BPP 8/8, BERENICE 15.8, vtx, EFW 2304g, posterior placenta  Cat 1 FHT, irregular ctx on toco    1820: Dr. Zafar in to discuss plan of care with patient.   Desires TOLAC.   Awaiting  to return to make decision of rpt  vs await labor.

## 2021-07-12 NOTE — CHART NOTE - NSCHARTNOTEFT_GEN_A_CORE
Discussed with patient and  (on phone) the plan. Patient has a previous c/s in [facility] for failure to dilate, bleeding, pain - per patient she was only 1cm. No operative report available. Patient wishes to TOLAC. Discussed with patient that TOLAC only advisable with LUST incision, with c/s outside of US and no operative report available, would recommend RCS given LUST can not be confirmed. Discussed risk of uterine rupture, increased risk with non-LUST incisions. Additionally, as patient is ruptured but not in labor (irregular contractions, not feeling contractions, no cervical change), our policy, which the patient was made aware of during her prenatal care, is that we do not perform inductions for patients with previous c/s.     Patient last ate at 3:15pm and will be NPO after 11:15pm. Discussed recommendation for RCS, however patient would like to be reexamined prior to RCS to assess for cervical change. Will plan to reexamine around 10pm, if no cervical change then patient agrees for RCS after 11pm. Anesthesia and nursing aware of plan.

## 2021-07-12 NOTE — OB RN TRIAGE NOTE - CURRENT PREGNANCY COMPLICATIONS, OB PROFILE
Gestational Diabetes admitted May s/p mag/beta/amp/Gestational Diabetes/Incompetent Cervix/Cervical Insufficiency

## 2021-07-12 NOTE — OB PROVIDER H&P - CURRENT PREGNANCY COMPLICATIONS, OB PROFILE
Short cervix/Admitted 5-6 for beta/mag/Gestational Diabetes/Incompetent Cervix/Cervical Insufficiency Short cervix/Admitted 5/6 for beta/mag/Gestational Diabetes/Incompetent Cervix/Cervical Insufficiency

## 2021-07-12 NOTE — OB PROVIDER TRIAGE NOTE - NSHPPHYSICALEXAM_GEN_ALL_CORE
Assessment reveals VSS, abdomen soft, NT, gravid.  SSE- +pooling, +nitrizine, +fern  VE 1/50/-3  BPP 8/8, BERENICE 15.8, vtx, EFW 2304g, posterior placenta  Cat 1 FHT, irregular ctx on toco

## 2021-07-12 NOTE — OB PROVIDER TRIAGE NOTE - NS_OBGYNHISTORY_OBGYN_ALL_OB_FT
2017 FT - Arrest/bleeding in [facility]- 5-0    AP course complicated by:  GDMA1  Short cervix treated with PV progesterone- admitted  for beta/mag

## 2021-07-12 NOTE — OB PROVIDER H&P - ASSESSMENT
Admit for PPROM  COVID 19 PCR Admit for PPROM  COVID 19 PCR  Routine orders  Finger sticks  Dr. Zafar to get GBS result  Awaiting  to return to make decision of rpt  vs await labor.   D/W Dr. Zafar Admit for PPROM  COVID 19 PCR  Routine orders  Finger stick  Awaiting  to return to make decision of rpt  vs await labor.   D/W Dr. Zafar

## 2021-07-13 ENCOUNTER — TRANSCRIPTION ENCOUNTER (OUTPATIENT)
Age: 26
End: 2021-07-13

## 2021-07-13 LAB
COVID-19 SPIKE DOMAIN AB INTERP: POSITIVE
COVID-19 SPIKE DOMAIN ANTIBODY RESULT: 17.5 U/ML — HIGH
GLUCOSE BLDC GLUCOMTR-MCNC: 77 MG/DL — SIGNIFICANT CHANGE UP (ref 70–99)
GLUCOSE BLDC GLUCOMTR-MCNC: 98 MG/DL — SIGNIFICANT CHANGE UP (ref 70–99)
RUBV IGG SER-ACNC: 4.8 INDEX — SIGNIFICANT CHANGE UP
RUBV IGG SER-IMP: POSITIVE — SIGNIFICANT CHANGE UP
SARS-COV-2 IGG+IGM SERPL QL IA: 17.5 U/ML — HIGH
SARS-COV-2 IGG+IGM SERPL QL IA: POSITIVE
T PALLIDUM AB TITR SER: NEGATIVE — SIGNIFICANT CHANGE UP

## 2021-07-13 RX ORDER — MAGNESIUM HYDROXIDE 400 MG/1
30 TABLET, CHEWABLE ORAL
Refills: 0 | Status: DISCONTINUED | OUTPATIENT
Start: 2021-07-13 | End: 2021-07-15

## 2021-07-13 RX ORDER — SODIUM CHLORIDE 9 MG/ML
1000 INJECTION, SOLUTION INTRAVENOUS ONCE
Refills: 0 | Status: COMPLETED | OUTPATIENT
Start: 2021-07-13 | End: 2021-07-13

## 2021-07-13 RX ORDER — SIMETHICONE 80 MG/1
80 TABLET, CHEWABLE ORAL EVERY 4 HOURS
Refills: 0 | Status: DISCONTINUED | OUTPATIENT
Start: 2021-07-13 | End: 2021-07-15

## 2021-07-13 RX ORDER — DIPHENHYDRAMINE HCL 50 MG
25 CAPSULE ORAL EVERY 6 HOURS
Refills: 0 | Status: DISCONTINUED | OUTPATIENT
Start: 2021-07-13 | End: 2021-07-15

## 2021-07-13 RX ORDER — SODIUM CHLORIDE 9 MG/ML
1000 INJECTION, SOLUTION INTRAVENOUS
Refills: 0 | Status: DISCONTINUED | OUTPATIENT
Start: 2021-07-13 | End: 2021-07-14

## 2021-07-13 RX ORDER — ASPIRIN/CALCIUM CARB/MAGNESIUM 324 MG
0 TABLET ORAL
Qty: 0 | Refills: 0 | DISCHARGE

## 2021-07-13 RX ORDER — HYDROMORPHONE HYDROCHLORIDE 2 MG/ML
1 INJECTION INTRAMUSCULAR; INTRAVENOUS; SUBCUTANEOUS
Refills: 0 | Status: DISCONTINUED | OUTPATIENT
Start: 2021-07-13 | End: 2021-07-13

## 2021-07-13 RX ORDER — OXYTOCIN 10 UNIT/ML
2 VIAL (ML) INJECTION
Qty: 30 | Refills: 0 | Status: DISCONTINUED | OUTPATIENT
Start: 2021-07-13 | End: 2021-07-13

## 2021-07-13 RX ORDER — HEPARIN SODIUM 5000 [USP'U]/ML
5000 INJECTION INTRAVENOUS; SUBCUTANEOUS EVERY 12 HOURS
Refills: 0 | Status: DISCONTINUED | OUTPATIENT
Start: 2021-07-13 | End: 2021-07-15

## 2021-07-13 RX ORDER — ONDANSETRON 8 MG/1
4 TABLET, FILM COATED ORAL ONCE
Refills: 0 | Status: COMPLETED | OUTPATIENT
Start: 2021-07-13 | End: 2021-07-13

## 2021-07-13 RX ORDER — KETOROLAC TROMETHAMINE 30 MG/ML
30 SYRINGE (ML) INJECTION EVERY 6 HOURS
Refills: 0 | Status: COMPLETED | OUTPATIENT
Start: 2021-07-13 | End: 2021-07-14

## 2021-07-13 RX ORDER — SODIUM CHLORIDE 9 MG/ML
300 INJECTION INTRAMUSCULAR; INTRAVENOUS; SUBCUTANEOUS ONCE
Refills: 0 | Status: DISCONTINUED | OUTPATIENT
Start: 2021-07-13 | End: 2021-07-13

## 2021-07-13 RX ORDER — ONDANSETRON 8 MG/1
4 TABLET, FILM COATED ORAL EVERY 6 HOURS
Refills: 0 | Status: DISCONTINUED | OUTPATIENT
Start: 2021-07-13 | End: 2021-07-15

## 2021-07-13 RX ORDER — IBUPROFEN 200 MG
600 TABLET ORAL EVERY 6 HOURS
Refills: 0 | Status: COMPLETED | OUTPATIENT
Start: 2021-07-13 | End: 2022-06-11

## 2021-07-13 RX ORDER — OXYCODONE HYDROCHLORIDE 5 MG/1
5 TABLET ORAL
Refills: 0 | Status: DISCONTINUED | OUTPATIENT
Start: 2021-07-13 | End: 2021-07-15

## 2021-07-13 RX ORDER — LANOLIN
1 OINTMENT (GRAM) TOPICAL EVERY 6 HOURS
Refills: 0 | Status: DISCONTINUED | OUTPATIENT
Start: 2021-07-13 | End: 2021-07-15

## 2021-07-13 RX ORDER — NALOXONE HYDROCHLORIDE 4 MG/.1ML
0.1 SPRAY NASAL
Refills: 0 | Status: DISCONTINUED | OUTPATIENT
Start: 2021-07-13 | End: 2021-07-15

## 2021-07-13 RX ORDER — METOCLOPRAMIDE HCL 10 MG
10 TABLET ORAL ONCE
Refills: 0 | Status: COMPLETED | OUTPATIENT
Start: 2021-07-13 | End: 2021-07-13

## 2021-07-13 RX ORDER — FERROUS SULFATE 325(65) MG
325 TABLET ORAL DAILY
Refills: 0 | Status: DISCONTINUED | OUTPATIENT
Start: 2021-07-13 | End: 2021-07-15

## 2021-07-13 RX ORDER — OXYCODONE HYDROCHLORIDE 5 MG/1
5 TABLET ORAL ONCE
Refills: 0 | Status: DISCONTINUED | OUTPATIENT
Start: 2021-07-13 | End: 2021-07-15

## 2021-07-13 RX ORDER — SENNA PLUS 8.6 MG/1
1 TABLET ORAL
Refills: 0 | Status: DISCONTINUED | OUTPATIENT
Start: 2021-07-13 | End: 2021-07-15

## 2021-07-13 RX ORDER — ACETAMINOPHEN 500 MG
975 TABLET ORAL
Refills: 0 | Status: DISCONTINUED | OUTPATIENT
Start: 2021-07-13 | End: 2021-07-15

## 2021-07-13 RX ORDER — OXYCODONE HYDROCHLORIDE 5 MG/1
5 TABLET ORAL
Refills: 0 | Status: DISCONTINUED | OUTPATIENT
Start: 2021-07-13 | End: 2021-07-13

## 2021-07-13 RX ORDER — OXYCODONE HYDROCHLORIDE 5 MG/1
10 TABLET ORAL
Refills: 0 | Status: DISCONTINUED | OUTPATIENT
Start: 2021-07-13 | End: 2021-07-13

## 2021-07-13 RX ORDER — TETANUS TOXOID, REDUCED DIPHTHERIA TOXOID AND ACELLULAR PERTUSSIS VACCINE, ADSORBED 5; 2.5; 8; 8; 2.5 [IU]/.5ML; [IU]/.5ML; UG/.5ML; UG/.5ML; UG/.5ML
0.5 SUSPENSION INTRAMUSCULAR ONCE
Refills: 0 | Status: DISCONTINUED | OUTPATIENT
Start: 2021-07-13 | End: 2021-07-15

## 2021-07-13 RX ORDER — OXYTOCIN 10 UNIT/ML
333.33 VIAL (ML) INJECTION
Qty: 20 | Refills: 0 | Status: DISCONTINUED | OUTPATIENT
Start: 2021-07-13 | End: 2021-07-14

## 2021-07-13 RX ADMIN — SODIUM CHLORIDE 1000 MILLILITER(S): 9 INJECTION, SOLUTION INTRAVENOUS at 09:30

## 2021-07-13 RX ADMIN — Medication 1000 MILLIUNIT(S)/MIN: at 07:00

## 2021-07-13 RX ADMIN — Medication 30 MILLIGRAM(S): at 23:45

## 2021-07-13 RX ADMIN — Medication 30 MILLIGRAM(S): at 18:26

## 2021-07-13 RX ADMIN — ONDANSETRON 4 MILLIGRAM(S): 8 TABLET, FILM COATED ORAL at 17:40

## 2021-07-13 RX ADMIN — Medication 30 MILLIGRAM(S): at 17:40

## 2021-07-13 RX ADMIN — Medication 975 MILLIGRAM(S): at 21:26

## 2021-07-13 RX ADMIN — Medication 10 MILLIGRAM(S): at 13:20

## 2021-07-13 RX ADMIN — Medication 975 MILLIGRAM(S): at 22:15

## 2021-07-13 RX ADMIN — FAMOTIDINE 20 MILLIGRAM(S): 10 INJECTION INTRAVENOUS at 05:15

## 2021-07-13 RX ADMIN — ONDANSETRON 4 MILLIGRAM(S): 8 TABLET, FILM COATED ORAL at 12:40

## 2021-07-13 RX ADMIN — Medication 2 MILLIUNIT(S)/MIN: at 01:20

## 2021-07-13 RX ADMIN — HEPARIN SODIUM 5000 UNIT(S): 5000 INJECTION INTRAVENOUS; SUBCUTANEOUS at 14:18

## 2021-07-13 RX ADMIN — Medication 30 MILLILITER(S): at 05:15

## 2021-07-13 RX ADMIN — SODIUM CHLORIDE 75 MILLILITER(S): 9 INJECTION, SOLUTION INTRAVENOUS at 09:43

## 2021-07-13 NOTE — CHART NOTE - NSCHARTNOTEFT_GEN_A_CORE
Notified by nurse that patient was experiencing vaginal pain. Tracing Category II with intermittent variables.  SVE: 5.5/90/-2  IUPC and FSE placed for intermittent variable decelerations.   Patient noted to have persistent variable decelerations  Amnioinfusion started.    Continue to monitor with internal fetal monitoring  Pitocin held at this time  Continue resuscitative measures PRN

## 2021-07-13 NOTE — OB PROVIDER IHI INDUCTION/AUGMENTATION NOTE - NSVAGINALEXAM_OBGYN_ALL_OB_DT
----- Message from Joaquina Campuzano sent at 5/16/2018  8:04 AM CDT -----  Contact: self/[phone]/phone  Patient is returning a phone call.  Who left a message for the patient: Dr Gentile's office  Does patient know what this is regarding:  no  Comments: thank you    
Attempted to contact pt. No answer, left voicemail for pt to return call.    
Per Dr. Gentile's instructions, the patient was advised. Pt verbalized understanding and instructed to contact office if any further questions or concerns.    
12-Jul-2021 21:48

## 2021-07-13 NOTE — DISCHARGE NOTE OB - MEDICATION SUMMARY - MEDICATIONS TO TAKE
I will START or STAY ON the medications listed below when I get home from the hospital:    PNV Prenatal oral tablet  -- 1 tab(s) by mouth once a day  -- Indication: For Postpartum   I will START or STAY ON the medications listed below when I get home from the hospital:    ibuprofen 600 mg oral tablet  -- 1 tab(s) by mouth every 6 hours, As Needed  -- Indication: For  delivery delivered    ferrous sulfate 325 mg (65 mg elemental iron) oral tablet  -- 1 tab(s) by mouth once a day  -- Indication: For Postpartum    PNV Prenatal oral tablet  -- 1 tab(s) by mouth once a day  -- Indication: For Postpartum

## 2021-07-13 NOTE — OB PROVIDER DELIVERY SUMMARY - NSSELHIDDEN_OBGYN_ALL_OB_FT
[NS_[email or web address]_ALL_OB_FT:[email or web address]],[NS_DeliveryAssist1_OBGYN_ALL_OB_FT:[id number]=],[NS_DeliveryRN_OBGYN_ALL_OB_FT:[id number]=]

## 2021-07-13 NOTE — DISCHARGE NOTE OB - PATIENT PORTAL LINK FT
You can access the FollowMyHealth Patient Portal offered by [facility] by registering at the following website: [email or web address]. By joining [employer]’s FollowMyHealth portal, you will also be able to view your health information using other applications (apps) compatible with our system.

## 2021-07-13 NOTE — CHART NOTE - NSCHARTNOTEFT_GEN_A_CORE
Patient seen and examined at bedside. Patient reports feeling well and her pain is well controlled.   Patient noted to have recurrent variable decelerations. Amnioinfusion already in place.   SVE: 5.5/90/-2.    Patient placed on left lateral.    Discussed findings with patient and  at bedside. If patient continues to have recurrent variable decelerations will recommended repeat  section. Patient reports understanding.    Update:    Patient still having recurrent variable decelerations. Recommended repeat  section.

## 2021-07-13 NOTE — DISCHARGE NOTE OB - CARE PROVIDER_API CALL
Enriqueta Thomas)  Obstetrics and Gynecology Obstetrics and Gynocology  [address] [place] [address]  Phone: [phone]  Fax: [phone]  Established Patient  Follow Up Time: 2 weeks

## 2021-07-13 NOTE — OB RN DELIVERY SUMMARY - NS_SEPSISRSKCALC_OBGYN_ALL_OB_FT
Rupture of membranes must be entered above.   EOS calculated successfully. EOS Risk Factor: 0.5/1000 live births ([facility] national incidence); GA=36w1d; Temp=98.6; ROM=18.633; GBS='Negative'; Antibiotics='No antibiotics or any antibiotics < 2 hrs prior to birth'

## 2021-07-13 NOTE — OB RN PREOPERATIVE CHECKLIST - NS_PREOPSPECIALEQ_OBGYN_ALL_OB
INTERVAL HPI/OVERNIGHT EVENTS:  Patient was seen and examined at bedside. As per night team, patient had to have trach suctioned and demonstrated some blood in the secretions. After suction, there was no additional note of blood observed from suctioning the trachea. Patient remained afebrile throughout the night, appears to be in no acute distress on morning rounds.     VITAL SIGNS:  T(F): 99.6 (05-15-20 @ 16:00)  HR: 100 (05-15-20 @ 16:00)  BP: 99/58 (05-15-20 @ 16:00)  RR: 23 (05-15-20 @ 16:00)  SpO2: 100% (05-15-20 @ 16:00)  Wt(kg): --    PHYSICAL EXAM:  General: No acute distress  Neuro: Does not open eyes to voice or pain, does not follow commands. Retracts shoulders to painful stimuli.  CV: RRR  Pulm: Currently on AC-VC, maintaining saturations.   : john in place   GI: s/p peg, rectal tube in place with loose stool  Ext: anasarca, 2+ pedal edema, right arm edema >left arm  lines: Picc Line on the right UE  Skin: generalized rash significantly improved     Allergies    amiodarone (Rash)  ampicillin (Rash)  phenobarbital (Rash)    Intolerances      MEDICATIONS  (STANDING):  amantadine 100 milliGRAM(s) Oral daily  benzonatate 100 milliGRAM(s) Oral every 8 hours  chlorhexidine 0.12% Liquid 15 milliLiter(s) Oral Mucosa every 12 hours  chlorhexidine 2% Cloths 1 Application(s) Topical <User Schedule>  cholecalciferol 1000 Unit(s) Oral every 24 hours  dextrose 5%. 1000 milliLiter(s) (50 mL/Hr) IV Continuous <Continuous>  dextrose 50% Injectable 12.5 Gram(s) IV Push once  dextrose 50% Injectable 25 Gram(s) IV Push once  enoxaparin Injectable 40 milliGRAM(s) SubCutaneous every 24 hours  fludroCORTISONE 0.1 milliGRAM(s) Oral every 24 hours  hydrocortisone 10 milliGRAM(s) Oral daily  insulin regular  human corrective regimen sliding scale   SubCutaneous every 6 hours  levETIRAcetam 1500 milliGRAM(s) Oral every 12 hours  levoFLOXacin IVPB 500 milliGRAM(s) IV Intermittent every 24 hours  midodrine 15 milliGRAM(s) Oral every 8 hours  norepinephrine Infusion 0.05 MICROgram(s)/kG/Min (6.56 mL/Hr) IV Continuous <Continuous>  oxyCODONE    IR 10 milliGRAM(s) Oral every 6 hours  pantoprazole   Suspension 40 milliGRAM(s) Oral every 24 hours  thiamine 100 milliGRAM(s) Oral daily  valproic  acid Syrup 750 milliGRAM(s) Oral every 8 hours    MEDICATIONS  (PRN):  acetaminophen    Suspension .. 650 milliGRAM(s) Oral every 6 hours PRN Temp greater or equal to 38C (100.4F)  acetaminophen 300 mG/codeine 30 mG 1 Tablet(s) Oral every 4 hours PRN cough  dextrose 40% Gel 15 Gram(s) Oral once PRN Blood Glucose LESS THAN 70 milliGRAM(s)/deciliter  glucagon  Injectable 1 milliGRAM(s) IntraMuscular once PRN Glucose LESS THAN 70 milligrams/deciliter  HYDROmorphone  Injectable 0.5 milliGRAM(s) IV Push every 6 hours PRN Breakthrough cough  sodium chloride 0.9% lock flush 10 milliLiter(s) IV Push every 1 hour PRN Pre/post blood products, medications, blood draw, and to maintain line patency    I&O's Summary    14 May 2020 07:01  -  15 May 2020 07:00  --------------------------------------------------------  IN: 1332.7 mL / OUT: 2090 mL / NET: -757.3 mL    15 May 2020 07:01  -  15 May 2020 16:24  --------------------------------------------------------  IN: 668.3 mL / OUT: 465 mL / NET: 203.3 mL        LABS:                        8.4    6.75  )-----------( 64       ( 15 May 2020 04:52 )             26.9     05-15    142  |  107  |  20  ----------------------------<  120<H>  3.9   |  25  |  0.55    Ca    7.4<L>      15 May 2020 04:52  Phos  2.0     05-15  Mg     2.2     05-15    TPro  4.8<L>  /  Alb  2.1<L>  /  TBili  0.2  /  DBili  x   /  AST  24  /  ALT  20  /  AlkPhos  113  05-15            RADIOLOGY & ADDITIONAL TESTS:  Reviewed n/a

## 2021-07-13 NOTE — OB RN DELIVERY SUMMARY - APGAR COMPLETED BY
Regular rate and rhythm, Heart sounds S1 S2 present, no murmurs, rubs or gallops FRANCI Escobar FRANCI Escobar/Pediatrician

## 2021-07-13 NOTE — DISCHARGE NOTE OB - CARE PLAN
Principal Discharge DX:	 delivery delivered  Goal:	Wound care  Assessment and plan of treatment:	Follow up in the office in 2 weeks

## 2021-07-13 NOTE — CHART NOTE - NSCHARTNOTEFT_GEN_A_CORE
R1 Progress Note    Called by nurse about blood pressure in the 80s/40s. Nurse noted patient normally runs low, and that repeat blood pressure had systolic in the 90's, and that bleeding was within normal limits.    Evaluated patient at bedside - patient stated that she wanted to sleep and felt tired, but otherwise felt fine. Denied dizziness or lightheadedness. Bleeding within normal limits. Patient stable at this time.    Dorothy Adame  PGY-1 R1 Progress Note    Called by nurse about blood pressure in the 80s/40s. Nurse noted patient normally runs low, and that repeat blood pressure had systolic in the 90's, and that bleeding was within normal limits.    Evaluated patient at bedside - patient stated that she wanted to sleep and felt tired, but otherwise felt fine. Denied dizziness or lightheadedness. Bleeding within normal limits. Patient stable at this time.    Addendum at 8:57 AM:  Reviewed patient's UOP, adequete, patient not tachycardic. Plan for bolus.    Dorothy Adame  PGY-1

## 2021-07-13 NOTE — OB RN DELIVERY SUMMARY - NSSELHIDDEN_OBGYN_ALL_OB_FT
[NS_DeliveryAttending1_OBGYN_ALL_OB_FT:[id number]],[NS_DeliveryAssist1_OBGYN_ALL_OB_FT:[id number]=],[NS_DeliveryRN_OBGYN_ALL_OB_FT:[id number]=]

## 2021-07-13 NOTE — DISCHARGE NOTE OB - MEDICATION SUMMARY - MEDICATIONS TO STOP TAKING
I will STOP taking the medications listed below when I get home from the hospital:    aspirin 81 mg oral tablet    progesterone 100 mg vaginal insert  -- 200 milligram(s) vaginal

## 2021-07-13 NOTE — CHART NOTE - NSCHARTNOTEFT_GEN_A_CORE
Patient seen and examined at bedside. Discussed proceeding with  section or trying TOLAC. Reviewed risks and benefits to both. Risk include but not limited to uterine rupture, emergency  section, bleeding, infection, and damage to nearby organs. Patient and partner report understanding. Discussed placement of epidural, CB, and starting Pitocin. Patient agreed.  Patient is s/p epidural placement.  SVE: /-2  Cervical balloon placed without difficulty 60cc flushed into each balloon.  Continue EFM  Start Pitocin

## 2021-07-13 NOTE — DISCHARGE NOTE OB - HOSPITAL COURSE
PPROM  FAILED TOLAC  R'C/S PPROM  FAILED TOLAC  R'C/S  Hypotension requiring fluid bolus that improved.

## 2021-07-13 NOTE — OB NEONATOLOGY/PEDIATRICIAN DELIVERY SUMMARY - NSPEDSNEONOTESA_OBGYN_ALL_OB_FT
Called by Dr. Jade to attend C/S delivery due to category II FHT and low fetal heart rate. Baby is  product of a 36.1 week gestation born to a  27year old female. Maternal labs include Blood Type A+, HIV- , RPR- , Hep B-, GBS- on , rest is unremarkable. Maternal history is significant for GDMA1. Pregnancy was uncomplicated. SROM at 11:00 with clear fluid, approximately 19hrs prior to delivery. Code 100 was called for low fetal HR, reported to be in the 60's. Baby was born vigorous and crying spontaneously. W/D/S/S. Apgars were: 9/9. EOS score 0.35. Highest maternal temp was 36.8 C. Admit to NICU low birth weight of 4lbs 14oz.  Temperature prior to transfer 36.7C. Mother would like to infant to receive Hep B and infant have a circumcision.

## 2021-07-13 NOTE — OB PROVIDER DELIVERY SUMMARY - NSPROVIDERDELIVERYNOTE_OBGYN_ALL_OB_FT
emergent rLTCS for NRFHT   Viable male infant, apgars 9/9, weight 2300g  3cm inferior extension right of midline, reinforced in 2 layers  Hysterotomy closed in 1 layer using vicryl   Grossly normal uterus, tubes, and ovaries  Abdomen closed in standard fashion  Pt to recovery in stable condition  EBL:274   IVF:1500    UOP:50 Patient was taken to the OR for Non urgent R'C/S. Fetal Heart Tones was in the low 70s. An Emergent RLTCS was performed. Bandl's Ring was noted.  Male  was born in VTX position, APGARS 9/9, weight 2300g. Right 4cm inferior extension was repaired in 2 layers. The Hysterotomy was closed in 1 layer with Vircyl. Grossly normal appearing uterus, ovaries, and fallopian tubes.   EBL:274   IVF:1500    UOP:50

## 2021-07-13 NOTE — OB RN INTRAOPERATIVE NOTE - NSSELHIDDEN_OBGYN_ALL_OB_FT
[NS_DeliveryAttending1_OBGYN_ALL_OB_FT:[email or web address]],[NS_DeliveryAssist1_OBGYN_ALL_OB_FT:[id number]=],[NS_DeliveryRN_OBGYN_ALL_OB_FT:[id number]=]

## 2021-07-13 NOTE — OB POSTPARTUM EVENT NOTE - NS_EVENTPTSUMMARY1_OBGYN_ALL_OB_FT
pt hypotensive BP 88/44 and 88/47 HR 90s  I/os adequate. patients bleeding is light-moderate. fundul checks : firm and at umbilicus

## 2021-07-14 LAB
ANISOCYTOSIS BLD QL: SLIGHT — SIGNIFICANT CHANGE UP
BASOPHILS # BLD AUTO: 0 K/UL — SIGNIFICANT CHANGE UP (ref 0–0.2)
BASOPHILS NFR BLD AUTO: 0 % — SIGNIFICANT CHANGE UP (ref 0–2)
EOSINOPHIL # BLD AUTO: 0.35 K/UL — SIGNIFICANT CHANGE UP (ref 0–0.5)
EOSINOPHIL NFR BLD AUTO: 1.7 % — SIGNIFICANT CHANGE UP (ref 0–6)
GIANT PLATELETS BLD QL SMEAR: PRESENT — SIGNIFICANT CHANGE UP
HCT VFR BLD CALC: 28.3 % — LOW (ref 34.5–45)
HGB BLD-MCNC: 9.1 G/DL — LOW (ref 11.5–15.5)
IANC: 14 K/UL — HIGH (ref 1.5–8.5)
LYMPHOCYTES # BLD AUTO: 17.2 % — SIGNIFICANT CHANGE UP (ref 13–44)
LYMPHOCYTES # BLD AUTO: 3.51 K/UL — HIGH (ref 1–3.3)
MCHC RBC-ENTMCNC: 26.8 PG — LOW (ref 27–34)
MCHC RBC-ENTMCNC: 32.2 GM/DL — SIGNIFICANT CHANGE UP (ref 32–36)
MCV RBC AUTO: 83.2 FL — SIGNIFICANT CHANGE UP (ref 80–100)
MICROCYTES BLD QL: SLIGHT — SIGNIFICANT CHANGE UP
MONOCYTES # BLD AUTO: 1.59 K/UL — HIGH (ref 0–0.9)
MONOCYTES NFR BLD AUTO: 7.8 % — SIGNIFICANT CHANGE UP (ref 2–14)
NEUTROPHILS # BLD AUTO: 14.25 K/UL — HIGH (ref 1.8–7.4)
NEUTROPHILS NFR BLD AUTO: 69.8 % — SIGNIFICANT CHANGE UP (ref 43–77)
PLAT MORPH BLD: ABNORMAL
PLATELET # BLD AUTO: 200 K/UL — SIGNIFICANT CHANGE UP (ref 150–400)
PLATELET COUNT - ESTIMATE: NORMAL — SIGNIFICANT CHANGE UP
POLYCHROMASIA BLD QL SMEAR: SLIGHT — SIGNIFICANT CHANGE UP
RBC # BLD: 3.4 M/UL — LOW (ref 3.8–5.2)
RBC # FLD: 13.7 % — SIGNIFICANT CHANGE UP (ref 10.3–14.5)
RBC BLD AUTO: ABNORMAL
SMUDGE CELLS # BLD: PRESENT — SIGNIFICANT CHANGE UP
VARIANT LYMPHS # BLD: 3.5 % — SIGNIFICANT CHANGE UP (ref 0–6)
WBC # BLD: 20.41 K/UL — HIGH (ref 3.8–10.5)
WBC # FLD AUTO: 20.41 K/UL — HIGH (ref 3.8–10.5)

## 2021-07-14 RX ORDER — IBUPROFEN 200 MG
600 TABLET ORAL EVERY 6 HOURS
Refills: 0 | Status: DISCONTINUED | OUTPATIENT
Start: 2021-07-14 | End: 2021-07-15

## 2021-07-14 RX ADMIN — Medication 975 MILLIGRAM(S): at 15:45

## 2021-07-14 RX ADMIN — Medication 975 MILLIGRAM(S): at 09:22

## 2021-07-14 RX ADMIN — Medication 30 MILLIGRAM(S): at 06:00

## 2021-07-14 RX ADMIN — Medication 975 MILLIGRAM(S): at 14:48

## 2021-07-14 RX ADMIN — Medication 30 MILLIGRAM(S): at 00:00

## 2021-07-14 RX ADMIN — Medication 600 MILLIGRAM(S): at 11:41

## 2021-07-14 RX ADMIN — Medication 1 TABLET(S): at 11:41

## 2021-07-14 RX ADMIN — Medication 975 MILLIGRAM(S): at 04:00

## 2021-07-14 RX ADMIN — Medication 600 MILLIGRAM(S): at 12:40

## 2021-07-14 RX ADMIN — Medication 600 MILLIGRAM(S): at 18:50

## 2021-07-14 RX ADMIN — Medication 30 MILLIGRAM(S): at 06:15

## 2021-07-14 RX ADMIN — Medication 975 MILLIGRAM(S): at 10:20

## 2021-07-14 RX ADMIN — Medication 600 MILLIGRAM(S): at 17:54

## 2021-07-14 RX ADMIN — HEPARIN SODIUM 5000 UNIT(S): 5000 INJECTION INTRAVENOUS; SUBCUTANEOUS at 02:40

## 2021-07-14 RX ADMIN — HEPARIN SODIUM 5000 UNIT(S): 5000 INJECTION INTRAVENOUS; SUBCUTANEOUS at 14:52

## 2021-07-14 RX ADMIN — Medication 325 MILLIGRAM(S): at 11:41

## 2021-07-14 RX ADMIN — Medication 975 MILLIGRAM(S): at 03:18

## 2021-07-14 NOTE — PROGRESS NOTE ADULT - ASSESSMENT
A/P: 24yo POD#1 s/p LTCS due to cat 2 FHR tracing.  Patient is stable and doing well post-operatively.    - Continue regular diet.  - Increase ambulation.  - Continue motrin, tylenol, oxycodone PRN for pain control.  -F/u if patient passes flatus  - F/u AM CBC    Jayda Springer MD PGY1 A/P: 24yo POD#1 s/p LTCS due to cat 2 FHR tracing.  Patient is stable and doing well post-operatively.    - Continue regular diet.  - Increase ambulation.  - Continue motrin, tylenol, oxycodone PRN for pain control.      Jayda Springer MD PGY1

## 2021-07-14 NOTE — PROGRESS NOTE ADULT - ATTENDING COMMENTS
doing well
doing well
POD #1 s/p STAT R'C/S for Cat II tracing during trial of labor. Patient doing well. Has not passed flatus as of yet  - Continue postpartum care  - Out of bed with ambulation  - Reviewed pain management with pain  - Wound care instructions reviewed  - Possible discharge tomorrow

## 2021-07-15 VITALS
OXYGEN SATURATION: 100 % | SYSTOLIC BLOOD PRESSURE: 109 MMHG | RESPIRATION RATE: 18 BRPM | HEART RATE: 89 BPM | DIASTOLIC BLOOD PRESSURE: 69 MMHG | TEMPERATURE: 98 F

## 2021-07-15 RX ORDER — FERROUS SULFATE 325(65) MG
1 TABLET ORAL
Qty: 0 | Refills: 0 | DISCHARGE
Start: 2021-07-15

## 2021-07-15 RX ORDER — IBUPROFEN 200 MG
1 TABLET ORAL
Qty: 0 | Refills: 0 | DISCHARGE
Start: 2021-07-15

## 2021-07-15 RX ADMIN — Medication 975 MILLIGRAM(S): at 15:49

## 2021-07-15 RX ADMIN — Medication 1 TABLET(S): at 13:24

## 2021-07-15 RX ADMIN — Medication 600 MILLIGRAM(S): at 00:45

## 2021-07-15 RX ADMIN — Medication 600 MILLIGRAM(S): at 00:11

## 2021-07-15 RX ADMIN — Medication 600 MILLIGRAM(S): at 14:24

## 2021-07-15 RX ADMIN — Medication 600 MILLIGRAM(S): at 13:24

## 2021-07-15 RX ADMIN — HEPARIN SODIUM 5000 UNIT(S): 5000 INJECTION INTRAVENOUS; SUBCUTANEOUS at 03:00

## 2021-07-15 RX ADMIN — Medication 975 MILLIGRAM(S): at 16:49

## 2021-07-15 RX ADMIN — HEPARIN SODIUM 5000 UNIT(S): 5000 INJECTION INTRAVENOUS; SUBCUTANEOUS at 13:27

## 2021-07-15 RX ADMIN — Medication 325 MILLIGRAM(S): at 13:24

## 2021-07-15 NOTE — PROGRESS NOTE ADULT - SUBJECTIVE AND OBJECTIVE BOX
NP Provider note:    Patient seen at bedside resting comfortably offers no new complaints. + Ambulation, + void without difficulty, + flatus;  no bm; tolerating regular diet. both breastfeeding and bottle feeding. Bonding well w/  Denies HA, blurry vision or epigastric pain, CP, SOB, N/V/D,  dizziness, palpitations, worsening vaginal bleeding.    Vital Signs Last 24 Hrs  T(C): 36.7 (15 Jul 2021 06:04), Max: 37.2 (2021 14:25)  T(F): 98.1 (15 Jul 2021 06:04), Max: 98.9 (2021 14:25)  HR: 74 (15 Jul 2021 06:04) (74 - 84)  BP: 115/71 (15 Jul 2021 06:04) (93/50 - 118/68)  BP(mean): --  RR: 18 (15 Jul 2021 06:04) (18 - 19)  SpO2: 97% (15 Jul 2021 06:04) (97% - 100%)    Gen: A&O x 3, NAD  Chest: CTABL  Cardiac: S1, S2, RRR  Breast: Soft, nontender, nonengorged  Abdomen: +BS; soft; Nontender, nondistended, Incision C/D/I steri strips in place   Gyn: Minimal lochia  Extremities: Nontender, DTRS 2+, no worsening edema                          9.1    20.41 )-----------( 200      ( 2021 07:25 )             28.3       A/P: POD #2 s/p c/s   -Pain management as needed  -cont post op care  -OOB and ambulate  - Reviewed CBC   -encourage insentive spirometer use  -Encourage breastfeeding   - cont PNV, iron daily  - RPT Glucose testing in 8wks PP  -d/w dr John Esqueda AGNP-c  [phone]
OB Progress Note:  Delivery, POD#1    S: 26yo POD#1 s/p LTCS due to cat 2 FHR tracing . Her pain is well controlled. She is tolerating a regular diet but is not yet passing flatus. Denies N/V. Denies CP/SOB/lightheadedness/dizziness.   She is ambulating without difficulty.   Voiding spontaneously.     O:   Vital Signs Last 24 Hrs  T(C): 36.7 (2021 02:02), Max: 37.1 (2021 14:32)  T(F): 98 (2021 02:02), Max: 98.7 (2021 14:32)  HR: 79 (2021 02:02) (74 - 141)  BP: 103/67 (2021 02:02) (73/57 - 118/65)  BP(mean): 75 (2021 13:30) (50 - 75)  RR: 17 (2021 02:02) (9 - 24)  SpO2: 100% (2021 02:02) (97% - 100%)    Labs:  Blood type: A Positive  Rubella IgG: RPR: Negative                          12.2   13.21<H> >-----------< 226    ( 12 @ 18:09 )             39.1                  PE:  General: NAD  Abdomen: Mildly distended, appropriately tender, incision c/d/i.  Extremities: No erythema, no pitting edema    
ANESTHESIA POSTOP CHECK    25y Female POSTOP DAY 1 S/P     Vital Signs Last 24 Hrs  T(C): 36.8 (14 Jul 2021 06:05), Max: 37.1 (13 Jul 2021 14:32)  T(F): 98.3 (14 Jul 2021 06:05), Max: 98.7 (13 Jul 2021 14:32)  HR: 82 (14 Jul 2021 06:05) (74 - 141)  BP: 95/61 (14 Jul 2021 06:05) (73/57 - 118/65)  BP(mean): 75 (13 Jul 2021 13:30) (60 - 75)  RR: 18 (14 Jul 2021 06:05) (13 - 24)  SpO2: 99% (14 Jul 2021 06:05) (97% - 100%)  I&O's Summary    13 Jul 2021 07:01  -  14 Jul 2021 07:00  --------------------------------------------------------  IN: 2050 mL / OUT: 2605 mL / NET: -555 mL        [ x] NO APPARENT ANESTHESIA COMPLICATIONS      
Postop Day  __1_ s/p   C- Section    THERAPY:  [  ] Spinal morphine   [ x ] Epidural morphine   [  ] IV PCA Hydromorphone 1 mg/ml      Sedation Score:	  [ x ] Alert	    [  ] Drowsy        [  ] Arousable	[  ] Asleep	[  ] Unresponsive    Side Effects:	  [ x ] None	     [  ] Nausea        [  ] Pruritus        [  ] Weakness   [  ] Numbness        ASSESSMENT/ PLAN   [x] Gross Neurological Exam within Normal Limits  [   ] Discontinue         [  ] Continue  [X] Change to PO Pain medications as per OB team  [ x ]Documentation and Verification of current medications     Comments:

## 2021-07-30 ENCOUNTER — APPOINTMENT (OUTPATIENT)
Dept: MATERNAL FETAL MEDICINE | Facility: CLINIC | Age: 26
End: 2021-07-30

## 2021-07-30 ENCOUNTER — NON-APPOINTMENT (OUTPATIENT)
Age: 26
End: 2021-07-30

## 2021-12-02 NOTE — OB RN PATIENT PROFILE - NS_PREOPBLOODCONS_OBGYN_ALL_OB
S/p PEA, asystole and ventricular tachycardia in ED on 12/2 secondary to hemorrhagic shock (ROSC after 7 rounds of CPR, IV epinephrine, IV bicarbonate solution and massive transfusion protocol)  Reviewed CODE STATUS with patient and her , POLST completed by palliative care, DANR/DNI   n/a

## 2022-06-29 NOTE — OB PROVIDER DELIVERY SUMMARY - NSCSREASONA_OBGYN_ALL_OB
Change in fetal status Hemigard Postcare Instructions: The HEMIGARD strips are to remain completely dry for at least 5-7 days.

## 2022-07-18 NOTE — DISCHARGE NOTE OB - PERSISTENT HEADACHE, BLURRED VISION
[Dear  ___] : Dear  [unfilled], [Consult Letter:] : I had the pleasure of evaluating your patient, [unfilled]. [( Thank you for referring [unfilled] for consultation for _____ )] : Thank you for referring [unfilled] for consultation for [unfilled] [Please see my note below.] : Please see my note below. [Consult Closing:] : Thank you very much for allowing me to participate in the care of this patient.  If you have any questions, please do not hesitate to contact me. [Sincerely,] : Sincerely, [FreeTextEntry2] : Dr. Trinh Campbell [FreeTextEntry3] : Carson Lockwood M.D. Statement Selected

## 2022-10-30 NOTE — OB RN PATIENT PROFILE - WEIGHT: TOTAL WEIGHT IN KG
Alert and oriented to person, place, time/situation. normal mood and affect. no apparent risk to self or others. 10

## 2024-04-02 NOTE — OB RN PATIENT PROFILE - NS_REVCONTRACEPTIVE_OBGYN_ALL_OB

## 2024-08-12 NOTE — OB RN PATIENT PROFILE - THE METHOD OF FEEDING WHEN THE NEWBORN REQUESTS AND CONTINUING EACH FEEDING SESSION UNTIL THE NEWBORN IS SATISFIED
Department of Anesthesiology  Postprocedure Note    Patient: Marielle Godoy  MRN: [id number]  YOB: 1939  Date of evaluation: 8/12/2024    Procedure Summary       Date: 08/12/24 Room / Location: [facility] ENDO 02 / [facility]    Anesthesia Start: 1146 Anesthesia Stop: 1157    Procedure: ESOPHAGOGASTRODUODENOSCOPY (Lower GI Region) Diagnosis:       Dysphagia, unspecified type      (Dysphagia, unspecified type [R13.10])    Surgeons: Noe Gale MD Responsible Provider: Tay Luke MD    Anesthesia Type: MAC ASA Status: 2            Anesthesia Type: MAC    Miguel Phase I: Miguel Score: 10    Miguel Phase II: Miguel Score: 9    Anesthesia Post Evaluation    Patient location during evaluation: bedside  Nausea & Vomiting: no nausea  Cardiovascular status: blood pressure returned to baseline  Respiratory status: acceptable  Hydration status: euvolemic    No notable events documented.   Statement Selected